# Patient Record
Sex: MALE | Race: WHITE | NOT HISPANIC OR LATINO | Employment: FULL TIME | URBAN - METROPOLITAN AREA
[De-identification: names, ages, dates, MRNs, and addresses within clinical notes are randomized per-mention and may not be internally consistent; named-entity substitution may affect disease eponyms.]

---

## 2017-01-12 ENCOUNTER — OFFICE VISIT (OUTPATIENT)
Dept: FAMILY MEDICINE | Facility: CLINIC | Age: 57
End: 2017-01-12
Payer: COMMERCIAL

## 2017-01-12 VITALS
SYSTOLIC BLOOD PRESSURE: 120 MMHG | DIASTOLIC BLOOD PRESSURE: 73 MMHG | OXYGEN SATURATION: 99 % | TEMPERATURE: 97.5 F | BODY MASS INDEX: 24.84 KG/M2 | WEIGHT: 196 LBS | HEART RATE: 78 BPM

## 2017-01-12 DIAGNOSIS — M51.379 DEGENERATIVE DISC DISEASE AT L5-S1 LEVEL: ICD-10-CM

## 2017-01-12 DIAGNOSIS — G89.29 CHRONIC MIDLINE LOW BACK PAIN WITH RIGHT-SIDED SCIATICA: Primary | ICD-10-CM

## 2017-01-12 DIAGNOSIS — M54.41 CHRONIC MIDLINE LOW BACK PAIN WITH RIGHT-SIDED SCIATICA: Primary | ICD-10-CM

## 2017-01-12 PROCEDURE — 99213 OFFICE O/P EST LOW 20 MIN: CPT | Performed by: FAMILY MEDICINE

## 2017-01-12 NOTE — MR AVS SNAPSHOT
After Visit Summary   1/12/2017    Dawson Garcia    MRN: [id number]           Patient Information     Date Of Birth          1960        Visit Information        Provider Department      1/12/2017 3:20 PM Ruthy Caal MD [facility]        Today's Diagnoses     Chronic midline low back pain with right-sided sciatica    -  1     Degenerative disc disease at L5-S1 level           Care Instructions    Low Back Pain--Exercises  What exercise can I do to reduce low back pain?  Pelvic tilt        Lie on your back with your knees bent. In this relaxed position, the small of your back will not be touching the floor. Tighten your abdominal muscles so that the small of your back presses flat against the floor. Hold for five seconds then relax. Repeat three times and gradually build to 10 repetitions.  Knees-to-chest        Lie on your back with both legs straight. Bring one knee up to your chest, pressing the small of your back into the floor (pelvic tilt). Hold for five seconds and repeat five times. Repeat exercise on other leg.  Back stretch  Lie on your stomach. Use your arms to push your upper body off the floor. Hold for five seconds. Let your back relax and sag. Repeat 10 times.      Exercises To Strengthen Your Lower Back  Strong lower-back and abdominal muscles work together to support your spine. The exercises below will help strengthen the muscles of the lower back. It is important that you begin exercising slowly and increase levels gradually.  Always begin any exercise program with stretching. If you feel pain while doing any of these exercises, stop and talk to your doctor about a more specific exercise program that better suits your condition.   Low back stretch  The point of stretching is to make you more flexible and increase your range of motion. Stretch only as much as you are able. Stretch slowly. Do not push your stretch to the limit. If at any point you feel  pain while stretching, this is your (temporary) limit.    Lie on your back with your knees bent and both feet on the ground.    Slowly raise your left knee to your chest as you flatten your lower back against the floor. Hold for 5 seconds.    Relax and repeat the exercise with your right knee.    Do 10 of these exercises for each leg.    Repeat hugging both knees to your chest at the same time.  Building lower back strength  Start your exercise routine with 10 to 30 minutes a day, 1 to 3 times a day.  Initial exercises  Lying on your back:  1. Ankle pumps: Move your foot up and down, towards your head, and then away. Repeat 10 times with each foot.  2. Heel slides: Slowly bend your knee, drawing the heel of your foot towards you. Then slide your heel/foot from you, straightening your knee. Do not lift your foot off the floor (this is not a leg lift).  3. Abdominal contraction: Bend your knees and put your hands on your stomach. Tighten your stomach muscles. Hold for 5 seconds, then relax. Repeat 10 times.  4. Straight leg raise: Bend one leg at the knee and keep the other leg straight. Tighten your stomach muscles. Slowly lift your straight leg 6 to 12 inches off the floor and hold for up to 5 seconds. Repeat 10 times on each side.  Standin. Wall squats: Stand with your back against the wall. Move your feet about 12 inches away from the wall. Tighten your stomach muscles, and slowly bend your knees until they are at about a 45 degree angle. Do not go down too far. Hold about 5 seconds. Then slowly return to your starting position. Repeat 10 times.  2. Heel raises: Stand facing the wall. Slowly raise the heels of your feet up and down, while keeping your toes on the floor. If you have trouble balancing, you can touch the wall with your hands. Repeat 10 times.  More advanced exercises  When you feel comfortable enough, try these exercises.  1. Kneeling lumbar extension: Begin on your hands and knees. At the same  time, raise and straighten your right arm and left leg until they are parallel to the ground. Hold for 2 seconds and come back slowly to a starting position. Repeat with left arm and right leg, alternating 10 times.  2. Prone lumbar extension: Lie face down, arms extended overhead, palms on the floor. At the same time, raise your right arm and left leg as high as comfortably possible. Hold for 10 seconds and slowly return to start. Repeat with left arm and right leg, alternating 10 times. Gradually build up to 20 times. (Advanced: Repeat this exercise raising both arms and both legs a few inches off the floor at the same time. Hold for 5 seconds and release.)  3. Pelvic tilt: Lie on the floor on your back with your knees bent at 90 degrees. Your feet should be flat on the floor. Inhale, exhale, then slowly contract your abdominal muscles bringing your navel toward your spine. Let your pelvis rock back until your lower back is flat on the floor. Hold for 10 seconds while breathing smoothly.  4. Abdominal crunch: Perform a pelvic tilt (above) flattening your lower back against the floor. Holding the tension in your abdominal muscles, take another breath and raise your shoulder blades off the ground (this is not a full sit-up). Keep your head in line with your body (don t bend your neck forward). Hold for 2 seconds, then slowly lower.    2127-2957 [employer]. [address] [place] [address]. All rights reserved. This information is not intended as a substitute for professional medical care. Always follow your healthcare professional's instructions.              Follow-ups after your visit        Additional Services     [facility] PT, HAND, AND CHIROPRACTIC REFERRAL       **This order will print in the [facility] Scheduling Office**    Physical Therapy, Hand Therapy and Chiropractic Care are available through:    *[facility]  *[facility]  *[facility]  "Care    Call one number to schedule at any of the above locations: [phone].    Your provider has referred you to: physical therapy     Indication/Reason for Referral: low back spine  Onset of Illness: years, acute injuiy  Therapy Orders: Evaluate and Treat  Special Programs: None  Special Request: None    Tabitha Lu      Additional Comments for the Therapist or Chiropractor:     Please be aware that coverage of these services is subject to the terms and limitations of your health insurance plan.  Call member services at your health plan with any benefit or coverage questions.      Please bring the following to your appointment:    *Your personal calendar for scheduling future appointments  *Comfortable clothing                  Who to contact     If you have questions or need follow up information about today's clinic visit or your schedule please contact [facility] directly at [phone].  Normal or non-critical lab and imaging results will be communicated to you by [employer] Scorecard[employer], letter or phone within 4 business days after the clinic has received the results. If you do not hear from us within 7 days, please contact the clinic through [employer]card[employer] or phone. If you have a critical or abnormal lab result, we will notify you by phone as soon as possible.  Submit refill requests through modu or call your pharmacy and they will forward the refill request to us. Please allow 3 business days for your refill to be completed.          Additional Information About Your Visit        modu Information     modu lets you send messages to your doctor, view your test results, renew your prescriptions, schedule appointments and more. To sign up, go to [email or web address] . Click on \"Log in\" on the left side of the screen, which will take you to the Welcome page. Then click on \"Sign up Now\" on the right side of the page.     You will be asked to enter the access code listed below, as well as some " personal information. Please follow the directions to create your username and password.     Your access code is: [email or web address]  Expires: 2017  3:46 PM     Your access code will  in 90 days. If you need help or a new code, please call your [place] clinic or [phone].        Care EveryWhere ID     This is your Care EveryWhere ID. This could be used by other organizations to access your [place] medical records  [id number]        Your Vitals Were     Pulse Temperature Pulse Oximetry             78 97.5  F (36.4  C) (Tympanic) 99%          Blood Pressure from Last 3 Encounters:   17 120/73   16 112/74   13 128/82    Weight from Last 3 Encounters:   17 196 lb (88.905 kg)   16 190 lb (86.183 kg)   13 192 lb (87.091 kg)              We Performed the Following     [facility], AND CH[facility]RACTIC REFERRAL        Primary Care Provider    None Specified       No primary provider on file.        Thank you!     Thank you for choosing [facility]  for your care. Our goal is always to provide you with excellent care. Hearing back from our patients is one way we can continue to improve our services. Please take a few minutes to complete the written survey that you may receive in the mail after your visit with us. Thank you!             Your Updated Medication List - Protect others around you: Learn how to safely use, store and throw away your medicines at www.disposemymeds.org.          This list is accurate as of: 17  3:46 PM.  Always use your most recent med list.                   Brand Name Dispense Instructions for use    ADVIL 200 MG tablet   Generic drug:  ibuprofen     120    TAKE 1 TO 2 TABLETS EVERY 4 TO 6 HOURS AS NEEDED WITH FOOD

## 2017-01-12 NOTE — PROGRESS NOTES
SUBJECTIVE:                                                    Dawson Garcia is a 56 year old male who presents to clinic today for the following health issues:      Musculoskeletal problem/pain; recurrent low back pain      Duration: 1.5 weeks    Description  Location: Middle lower back    Intensity:  moderate    Accompanying signs and symptoms: none    History  Previous similar problem: no   Previous evaluation:  none    Precipitating or alleviating factors:  Trauma or overuse: YES- Working out at the gym; did a rowing machine for 15 minutes, he didn't feel like he overdid it, felt good while exercising, but the next morning back was really tight  Aggravating factors include: he's had similar episodes after going on bike rides in the summer, would have a lot of soreness and spasm the next morning    Therapies tried and outcome: NSAID - ibuprofen       Problem list and histories reviewed & adjusted, as indicated.  Additional history: as documented    Patient Active Problem List   Diagnosis     CARDIOVASCULAR SCREENING; LDL GOAL LESS THAN 160     Chronic midline low back pain with right-sided sciatica     Degenerative disc disease at L5-S1 level     Past Surgical History   Procedure Laterality Date     C appendectomy       Cl aff surgical pathology       Removal of sperm duct(s)  1994      tooth extraction w/forcep         Social History   Substance Use Topics     Smoking status: Never Smoker      Smokeless tobacco: Never Used     Alcohol Use: Yes      Comment: sometimes     Family History   Problem Relation Age of Onset     C.A.D. Paternal Grandfather      HEART DISEASE Paternal Grandfather      MI      DIABETES Father      diet controlled     HEART DISEASE Father      Hypertension No family hx of      CEREBROVASCULAR DISEASE No family hx of      Breast Cancer No family hx of      Cancer - colorectal No family hx of      Prostate Cancer No family hx of      Melanoma No family hx of      Skin Cancer No family  hx of          BP Readings from Last 3 Encounters:   01/12/17 120/73   05/31/16 112/74   06/07/13 128/82    Wt Readings from Last 3 Encounters:   01/12/17 196 lb (88.905 kg)   05/31/16 190 lb (86.183 kg)   06/07/13 192 lb (87.091 kg)                    ROS:  Constitutional, HEENT, cardiovascular, pulmonary, gi and gu systems are negative, except as otherwise noted.    OBJECTIVE:                                                    /73 mmHg  Pulse 78  Temp(Src) 97.5  F (36.4  C) (Tympanic)  Wt 196 lb (88.905 kg)  SpO2 99%  Body mass index is 24.84 kg/(m^2).  /73 mmHg  Pulse 78  Temp(Src) 97.5  F (36.4  C) (Tympanic)  Wt 196 lb (88.905 kg)  SpO2 99%   Patient appears to be in no distress, normal gait noted. Lumbosacral spine area reveals mild tenderness just to right of midline of approximately L5 without muscle spasm or mass.  Normal LS ROM noted. Straight leg raise is positive at 90 degrees on right. DTR's, motor strength and sensation normal, including heel and toe gait.  Peripheral pulses are palpable.    Lumbar spine X-Ray last done 2008, normal at that time.    ASSESSMENT:   ASSESSMENT / PLAN:  (M54.41,  G89.29) Chronic midline low back pain with right-sided sciatica  (primary encounter suspect due to  (M51.36) Degenerative disc disease at L5-S1 level  Comment: start with physical therapy, use standing desk/yoga ball at desk to improve core strength, take ibuprofen after workout  Plan: [facility], AND CH[facility]CTIC REFERRAL            For acute pain, rest, intermittent application of heat (do not sleep on heating pad), analgesics and muscle relaxants are recommended. Discussed longer term treatment plan of prn NSAID's and discussed a home back care exercise program with flexion exercise routine. Proper lifting with avoidance of heavy lifting discussed.   Referred to Physical Therapy  Will obtain XRay studies if not improving and refer to Ortho.   Call or return to clinic prn if these symptoms  worsen or fail to improve as anticipated.

## 2017-01-12 NOTE — NURSING NOTE
"Chief Complaint   Patient presents with     Musculoskeletal Problem       Initial /73 mmHg  Pulse 78  Temp(Src) 97.5  F (36.4  C) (Tympanic)  Wt 196 lb (88.905 kg)  SpO2 99% Estimated body mass index is 24.84 kg/(m^2) as calculated from the following:    Height as of 5/31/16: 6' 2.5\" (1.892 m).    Weight as of this encounter: 196 lb (88.905 kg).  BP completed using cuff size: regular      Muriel Walsh MA    "

## 2017-01-12 NOTE — PATIENT INSTRUCTIONS
Low Back Pain--Exercises  What exercise can I do to reduce low back pain?  Pelvic tilt        Lie on your back with your knees bent. In this relaxed position, the small of your back will not be touching the floor. Tighten your abdominal muscles so that the small of your back presses flat against the floor. Hold for five seconds then relax. Repeat three times and gradually build to 10 repetitions.  Knees-to-chest        Lie on your back with both legs straight. Bring one knee up to your chest, pressing the small of your back into the floor (pelvic tilt). Hold for five seconds and repeat five times. Repeat exercise on other leg.  Back stretch  Lie on your stomach. Use your arms to push your upper body off the floor. Hold for five seconds. Let your back relax and sag. Repeat 10 times.      Exercises To Strengthen Your Lower Back  Strong lower-back and abdominal muscles work together to support your spine. The exercises below will help strengthen the muscles of the lower back. It is important that you begin exercising slowly and increase levels gradually.  Always begin any exercise program with stretching. If you feel pain while doing any of these exercises, stop and talk to your doctor about a more specific exercise program that better suits your condition.   Low back stretch  The point of stretching is to make you more flexible and increase your range of motion. Stretch only as much as you are able. Stretch slowly. Do not push your stretch to the limit. If at any point you feel pain while stretching, this is your (temporary) limit.    Lie on your back with your knees bent and both feet on the ground.    Slowly raise your left knee to your chest as you flatten your lower back against the floor. Hold for 5 seconds.    Relax and repeat the exercise with your right knee.    Do 10 of these exercises for each leg.    Repeat hugging both knees to your chest at the same time.  Building lower back strength  Start your exercise  routine with 10 to 30 minutes a day, 1 to 3 times a day.  Initial exercises  Lying on your back:  1. Ankle pumps: Move your foot up and down, towards your head, and then away. Repeat 10 times with each foot.  2. Heel slides: Slowly bend your knee, drawing the heel of your foot towards you. Then slide your heel/foot from you, straightening your knee. Do not lift your foot off the floor (this is not a leg lift).  3. Abdominal contraction: Bend your knees and put your hands on your stomach. Tighten your stomach muscles. Hold for 5 seconds, then relax. Repeat 10 times.  4. Straight leg raise: Bend one leg at the knee and keep the other leg straight. Tighten your stomach muscles. Slowly lift your straight leg 6 to 12 inches off the floor and hold for up to 5 seconds. Repeat 10 times on each side.  Standin. Wall squats: Stand with your back against the wall. Move your feet about 12 inches away from the wall. Tighten your stomach muscles, and slowly bend your knees until they are at about a 45 degree angle. Do not go down too far. Hold about 5 seconds. Then slowly return to your starting position. Repeat 10 times.  2. Heel raises: Stand facing the wall. Slowly raise the heels of your feet up and down, while keeping your toes on the floor. If you have trouble balancing, you can touch the wall with your hands. Repeat 10 times.  More advanced exercises  When you feel comfortable enough, try these exercises.  1. Kneeling lumbar extension: Begin on your hands and knees. At the same time, raise and straighten your right arm and left leg until they are parallel to the ground. Hold for 2 seconds and come back slowly to a starting position. Repeat with left arm and right leg, alternating 10 times.  2. Prone lumbar extension: Lie face down, arms extended overhead, palms on the floor. At the same time, raise your right arm and left leg as high as comfortably possible. Hold for 10 seconds and slowly return to start. Repeat with  left arm and right leg, alternating 10 times. Gradually build up to 20 times. (Advanced: Repeat this exercise raising both arms and both legs a few inches off the floor at the same time. Hold for 5 seconds and release.)  3. Pelvic tilt: Lie on the floor on your back with your knees bent at 90 degrees. Your feet should be flat on the floor. Inhale, exhale, then slowly contract your abdominal muscles bringing your navel toward your spine. Let your pelvis rock back until your lower back is flat on the floor. Hold for 10 seconds while breathing smoothly.  4. Abdominal crunch: Perform a pelvic tilt (above) flattening your lower back against the floor. Holding the tension in your abdominal muscles, take another breath and raise your shoulder blades off the ground (this is not a full sit-up). Keep your head in line with your body (don t bend your neck forward). Hold for 2 seconds, then slowly lower.    3689-1979 [employer]. [address] [place] [address], [place] [address]. All rights reserved. This information is not intended as a substitute for professional medical care. Always follow your healthcare professional's instructions.

## 2017-01-20 ENCOUNTER — THERAPY VISIT (OUTPATIENT)
Dept: PHYSICAL THERAPY | Facility: CLINIC | Age: 57
End: 2017-01-20
Payer: COMMERCIAL

## 2017-01-20 DIAGNOSIS — M54.50 LUMBAGO: Primary | ICD-10-CM

## 2017-01-20 PROCEDURE — 97110 THERAPEUTIC EXERCISES: CPT | Mod: GP | Performed by: PHYSICAL THERAPIST

## 2017-01-20 PROCEDURE — 97161 PT EVAL LOW COMPLEX 20 MIN: CPT | Mod: GP | Performed by: PHYSICAL THERAPIST

## 2017-01-20 PROCEDURE — 97112 NEUROMUSCULAR REEDUCATION: CPT | Mod: GP | Performed by: PHYSICAL THERAPIST

## 2017-01-20 NOTE — PROGRESS NOTES
"Subjective:  Physical Therapy Initial Evaluation   1/20/17  Ruthy Caal MD Precautions/Restrictions/MD instructions: E and T, LBP   Therapist Impression:   Pt is a 57 y/o male, with 2 week history of LBP. Pt presents with pain, reduced ROM, weakness, reduced mm activation, and postural deficits consistent with mechanical LBP. These impairments limit their ability to ambulate, sit, lift household items, and perform work duties. Skilled PT services necessary in order to reduce impairments and improve independent function.    Subjective:   Chief Complaint: Pt reports that a couple of weeks ago he was working out and tried the rowing machine for approx 15 min. States that either the evening or day after his back \"locked up.\" Reports past hx of back \"going out.\" Sitting for >60 min causes him to be stiff when he stands up. Does seem to be getting better overall  DOI/onset: 2-3 weeks DOS: n/a  Location: [facility] region Quality: mostly dull/achy  Frequency: intermittent  Radiates: nil  Pain scale: Rest 0-1/10 Activity 6/10   Time of day: gets better as the day goes on Sleeping: if he moves he will have some does    Exacerbated by: sitting for long periods of time Relieved by: while sitting Progression: seem to be getting better  Previous Treatment: ibuprofen Effect of prior treatment: helpful   PMH and/or surgical history: appendix/hernia, past hx of LBP  Imaging: none    Occupation:  Job duties: prolonged sitting, computer work   Current HEP/exercise regimen: enjoys biking/walking; tried to start up this month Patient's goals: get back to normal activities  Medications: pain   General health as reported by patient: fair  Return to MD: PRN   HPI                    Objective:  Lumbar Spine/SIJ Evaluation:    Gait: Reduced hip ext, B.    Posture: mild ant tilt  Relevant Lateral Shift: nil    Lumbar AROM:   Motion ROM Pain   Flexion Distal tibia R sided   Extension Min loss nil   Side Bend L WNL nil   Side " Bend R WNL nil   Side Gliding L WNL nil   Side Gliding R WNL nil     Repeated Motion: improved flex ROM w/ less pain    Lumbar Myotomes   L R   T12-L3 (Hip Flexion) 5/5 5/5   L2-L4 (Knee Extension) 5/5 5/5   L4 (Ankle DF) 5/5 5/5   L5 (Great Toe Ext) 5/5 5/5   S1 (Ankle PF) 5/5 5/5     Lumbar DTR's:    L R   L4 (Quad) 1+ 1+   S1 (Achilles) 1+ 1+     Lumbar Dermatomes: WNL    SI Joint Provocation Tests: neg    Muscle Activation    L R   TrA poor poor   Glute Max fair fair       System    Physical Exam    General     ROS    Assessment/Plan:      Patient is a 56 year old male with lumbar complaints.    Patient has the following significant findings with corresponding treatment plan.                Diagnosis 1:  LBP  Pain -  hot/cold therapy, manual therapy, splint/taping/bracing/orthotics, education, directional preference exercise and home program  Decreased ROM/flexibility - manual therapy and therapeutic exercise  Decreased joint mobility - manual therapy and therapeutic exercise  Decreased strength - therapeutic exercise and therapeutic activities  Decreased proprioception - neuro re-education and therapeutic activities  Impaired gait - gait training  Impaired muscle performance - neuro re-education  Decreased function - therapeutic activities  Impaired posture - neuro re-education    Therapy Evaluation Codes:   1) History comprised of:   Personal factors that impact the plan of care:      None.    Comorbidity factors that impact the plan of care are:      None.     Medications impacting care: Pain.  2) Examination of Body Systems comprised of:   Body structures and functions that impact the plan of care:      Hip and Lumbar spine.   Activity limitations that impact the plan of care are:      Bathing, Bending, Dressing, Reading/Computer work and Working and Sleeping.  3) Clinical presentation characteristics are:   Stable/Uncomplicated.  4) Decision-Making    Low complexity using standardized patient assessment  instrument and/or measureable assessment of functional outcome.  Cumulative Therapy Evaluation is: Low complexity.    Previous and current functional limitations:  (See Goal Flow Sheet for this information)    Short term and Long term goals: (See Goal Flow Sheet for this information)     Communication ability:  Patient appears to be able to clearly communicate and understand verbal and written communication and follow directions correctly.  Treatment Explanation - The following has been discussed with the patient:   RX ordered/plan of care  Anticipated outcomes  Possible risks and side effects  This patient would benefit from PT intervention to resume normal activities.   Rehab potential is excellent.    Frequency:  1 X week, once daily  Duration:  for 3-4 visits  Discharge Plan:  Achieve all LTG.  Independent in home treatment program.  Reach maximal therapeutic benefit.    Please refer to the daily flowsheet for treatment today, total treatment time and time spent performing 1:1 timed codes.

## 2017-04-25 ENCOUNTER — OFFICE VISIT (OUTPATIENT)
Dept: FAMILY MEDICINE | Facility: CLINIC | Age: 57
End: 2017-04-25
Payer: COMMERCIAL

## 2017-04-25 VITALS
BODY MASS INDEX: 24.38 KG/M2 | OXYGEN SATURATION: 97 % | DIASTOLIC BLOOD PRESSURE: 64 MMHG | SYSTOLIC BLOOD PRESSURE: 108 MMHG | WEIGHT: 192.5 LBS | HEART RATE: 73 BPM | TEMPERATURE: 97.4 F

## 2017-04-25 DIAGNOSIS — H10.022 PINK EYE, LEFT: Primary | ICD-10-CM

## 2017-04-25 PROCEDURE — 99213 OFFICE O/P EST LOW 20 MIN: CPT | Performed by: FAMILY MEDICINE

## 2017-04-25 RX ORDER — POLYMYXIN B SULFATE AND TRIMETHOPRIM 1; 10000 MG/ML; [USP'U]/ML
1 SOLUTION OPHTHALMIC EVERY 4 HOURS
Qty: 1 BOTTLE | Refills: 0 | Status: SHIPPED | OUTPATIENT
Start: 2017-04-25 | End: 2017-05-02

## 2017-04-25 ASSESSMENT — ANXIETY QUESTIONNAIRES
7. FEELING AFRAID AS IF SOMETHING AWFUL MIGHT HAPPEN: NOT AT ALL
5. BEING SO RESTLESS THAT IT IS HARD TO SIT STILL: NOT AT ALL
6. BECOMING EASILY ANNOYED OR IRRITABLE: NOT AT ALL
IF YOU CHECKED OFF ANY PROBLEMS ON THIS QUESTIONNAIRE, HOW DIFFICULT HAVE THESE PROBLEMS MADE IT FOR YOU TO DO YOUR WORK, TAKE CARE OF THINGS AT HOME, OR GET ALONG WITH OTHER PEOPLE: NOT DIFFICULT AT ALL
GAD7 TOTAL SCORE: 1
1. FEELING NERVOUS, ANXIOUS, OR ON EDGE: NOT AT ALL
3. WORRYING TOO MUCH ABOUT DIFFERENT THINGS: SEVERAL DAYS
2. NOT BEING ABLE TO STOP OR CONTROL WORRYING: NOT AT ALL

## 2017-04-25 ASSESSMENT — PATIENT HEALTH QUESTIONNAIRE - PHQ9: 5. POOR APPETITE OR OVEREATING: NOT AT ALL

## 2017-04-25 NOTE — MR AVS SNAPSHOT
After Visit Summary   4/25/2017    Dawson Garcia    MRN: [id number]           Patient Information     Date Of Birth          1960        Visit Information        Provider Department      4/25/2017 10:20 AM Wegener, Joel Daniel [facility]win[facility]        Today's Diagnoses     Pink eye, left    -  1      Care Instructions    ASSESSMENT AND PLAN  1. Pink eye, left  Treat with eye drops for seven days.     Avoid spread to other's eyes/nose/mouth.     If developing eye allergy symptoms can use over the counter allergy eye drops.     Follow up as needed.       - trimethoprim-polymyxin b (POLYTRIM) ophthalmic solution; Apply 1 drop to eye every 4 hours for 7 days  Dispense: 1 Bottle; Refill: 0        MYCHART SIGNUP FOR E-VISITS AND EASIER COMMUNICATION:  [email or web address]     Zipnosis:  [employer].  Sign up for e-visits for common illnesses!     RADIOLOGY:   [facility]:  [phone] to schedule any radiology tests at [facility]: [phone]    Mammograms/colonoscopies:  [phone]    CONSUMER PRICE LINE for estimates of test costs:  [phone]             Follow-ups after your visit        Who to contact     If you have questions or need follow up information about today's clinic visit or your schedule please contact [facility] directly at [phone].  Normal or non-critical lab and imaging results will be communicated to you by MyChart, letter or phone within 4 business days after the clinic has received the results. If you do not hear from us within 7 days, please contact the clinic through MyChart or phone. If you have a critical or abnormal lab result, we will notify you by phone as soon as possible.  Submit refill requests through [employer]Â® or call your pharmacy and they will forward the refill request to us. Please allow 3 business days for your refill to be completed.          Additional Information About Your  "Visit        Dragonfly List Information     Dragonfly List lets you send messages to your doctor, view your test results, renew your prescriptions, schedule appointments and more. To sign up, go to [email or web address] . Click on \"Log in\" on the left side of the screen, which will take you to the Welcome page. Then click on \"Sign up Now\" on the right side of the page.     You will be asked to enter the access code listed below, as well as some personal information. Please follow the directions to create your username and password.     Your access code is: [email or web address]  Expires: 2017 10:25 AM     Your access code will  in 90 days. If you need help or a new code, please call your [facility] clinic or [phone].        Care EveryWhere ID     This is your Care EveryWhere ID. This could be used by other organizations to access your [facility] medical records  [id number]         Blood Pressure from Last 3 Encounters:   17 120/73   16 112/74   13 128/82    Weight from Last 3 Encounters:   17 196 lb (88.9 kg)   16 190 lb (86.2 kg)   13 192 lb (87.1 kg)              Today, you had the following     No orders found for display         Today's Medication Changes          These changes are accurate as of: 17 10:25 AM.  If you have any questions, ask your nurse or doctor.               Start taking these medicines.        Dose/Directions    trimethoprim-polymyxin b ophthalmic solution   Commonly known as:  POLYTRIM   Used for:  Pink eye, left        Dose:  1 drop   Apply 1 drop to eye every 4 hours for 7 days   Quantity:  1 Bottle   Refills:  0            Where to get your medicines      These medications were sent to [facility] [place], [place] - [address]  [address], [facility] [address]     Phone:  [phone]     trimethoprim-polymyxin b ophthalmic solution                Primary Care Provider    None Specified       No primary provider on file.      "   Thank you!     Thank you for choosing [facility]  for your care. Our goal is always to provide you with excellent care. Hearing back from our patients is one way we can continue to improve our services. Please take a few minutes to complete the written survey that you may receive in the mail after your visit with us. Thank you!             Your Updated Medication List - Protect others around you: Learn how to safely use, store and throw away your medicines at www.disposemymeds.org.          This list is accurate as of: 4/25/17 10:25 AM.  Always use your most recent med list.                   Brand Name Dispense Instructions for use    ADVIL 200 MG tablet   Generic drug:  ibuprofen     120    TAKE 1 TO 2 TABLETS EVERY 4 TO 6 HOURS AS NEEDED WITH FOOD       trimethoprim-polymyxin b ophthalmic solution    POLYTRIM    1 Bottle    Apply 1 drop to eye every 4 hours for 7 days

## 2017-04-25 NOTE — PATIENT INSTRUCTIONS
ASSESSMENT AND PLAN  1. Pink eye, left  Treat with eye drops for seven days.     Avoid spread to other's eyes/nose/mouth.     If developing eye allergy symptoms can use over the counter allergy eye drops.     Follow up as needed.       - trimethoprim-polymyxin b (POLYTRIM) ophthalmic solution; Apply 1 drop to eye every 4 hours for 7 days  Dispense: 1 Bottle; Refill: 0        MYCHART SIGNUP FOR E-VISITS AND EASIER COMMUNICATION:  [email or web address]     Zipnosis:  [place].  Sign up for e-visits for common illnesses!     RADIOLOGY:   [facility]:  [phone] to schedule any radiology tests at [facility]: [phone]    Mammograms/colonoscopies:  [phone]    CONSUMER PRICE LINE for estimates of test costs:  [phone]

## 2017-04-25 NOTE — NURSING NOTE
"Chief Complaint   Patient presents with     Conjunctivitis       Initial /64 (BP Location: Left arm, Patient Position: Chair, Cuff Size: Adult Regular)  Pulse 73  Temp 97.4  F (36.3  C) (Tympanic)  Wt 192 lb 8 oz (87.3 kg)  SpO2 97%  BMI 24.38 kg/m2 Estimated body mass index is 24.38 kg/(m^2) as calculated from the following:    Height as of 5/31/16: 6' 2.5\" (1.892 m).    Weight as of this encounter: 192 lb 8 oz (87.3 kg).  Medication Reconciliation: complete Tim Clark MA      "

## 2017-04-26 ASSESSMENT — PATIENT HEALTH QUESTIONNAIRE - PHQ9: SUM OF ALL RESPONSES TO PHQ QUESTIONS 1-9: 4

## 2017-04-26 ASSESSMENT — ANXIETY QUESTIONNAIRES: GAD7 TOTAL SCORE: 1

## 2017-04-30 ENCOUNTER — OFFICE VISIT (OUTPATIENT)
Dept: URGENT CARE | Facility: URGENT CARE | Age: 57
End: 2017-04-30
Payer: COMMERCIAL

## 2017-04-30 VITALS
DIASTOLIC BLOOD PRESSURE: 64 MMHG | TEMPERATURE: 98 F | SYSTOLIC BLOOD PRESSURE: 128 MMHG | OXYGEN SATURATION: 99 % | RESPIRATION RATE: 14 BRPM | HEART RATE: 94 BPM | BODY MASS INDEX: 23.87 KG/M2 | WEIGHT: 192 LBS | HEIGHT: 75 IN

## 2017-04-30 DIAGNOSIS — H10.12 ALLERGIC CONJUNCTIVITIS, LEFT: Primary | ICD-10-CM

## 2017-04-30 PROCEDURE — 99213 OFFICE O/P EST LOW 20 MIN: CPT | Performed by: FAMILY MEDICINE

## 2017-04-30 NOTE — PROGRESS NOTES
SUBJECTIVE:   Chief Complaint   Patient presents with     Urgent Care     Eye Problem     left eye has pinkeye symptoms x 1 week, was seen on Tuesday and was given eye drops but getting worse.      Dawson Garcia is a 56 year old male with burning, redness, itching, watering in left eye for 7 days.  He was treated for bacterial conjunctivitis with polytrim and the left eye has become more red, puffy eyelid,  The right eye is normal ( no drops on the right)   Other symptoms:  None,  No systemic allergic symptoms  No significant prior ophthalmological history. No change in visual acuity, no photophobia, no severe eye pain.     Patient does not  use contact lenses.    Past Medical History:   Diagnosis Date     Alopecia areata      Backache, unspecified      Esophageal reflux     EGD 2000's     Hiatal hernia        ALLERGIES:  Polytrim [polymyxin b-trimethoprim]      Current Outpatient Prescriptions on File Prior to Visit:  trimethoprim-polymyxin b (POLYTRIM) ophthalmic solution Apply 1 drop to eye every 4 hours for 7 days   ADVIL 200 MG OR TABS TAKE 1 TO 2 TABLETS EVERY 4 TO 6 HOURS AS NEEDED WITH FOOD     No current facility-administered medications on file prior to visit.     Social History   Substance Use Topics     Smoking status: Never Smoker     Smokeless tobacco: Never Used     Alcohol use Yes      Comment: sometimes       Family History   Problem Relation Age of Onset     C.A.D. Paternal Grandfather      HEART DISEASE Paternal Grandfather      MI      DIABETES Father      diet controlled     HEART DISEASE Father      Hypertension No family hx of      CEREBROVASCULAR DISEASE No family hx of      Breast Cancer No family hx of      Cancer - colorectal No family hx of      Prostate Cancer No family hx of      Melanoma No family hx of      Skin Cancer No family hx of        ROS:  INTEGUMENTARY/SKIN: NEGATIVE for worrisome rashes, moles or lesions  ENT/MOUTH: NEGATIVE for ear, mouth and throat  "problems  RESP:NEGATIVE for significant cough or SOB  GI: NEGATIVE for nausea, abdominal pain, heartburn, or change in bowel habits    OBJECTIVE:   /64  Pulse 94  Temp 98  F (36.7  C) (Oral)  Resp 14  Ht 6' 2.5\" (1.892 m)  Wt 192 lb (87.1 kg)  SpO2 99%  BMI 24.32 kg/m2    Patient appears well, vitals signs are normal. Eyes: left eye with findings of typical allergic conjunctivitis noted; erythema , watering with a little eyelid puffiness. PERRLA, no foreign body noted. No periorbital cellulitis. The corneas are clear .       Nose:  Mild swelling of turbinates bilateral, with clear discharge  Face:  No sinus tenderness  Ears: no erythema, no swelling of the bilateral ear canals.  TM's pearly bilateral  Mouth:  Pharynx, no erythema, no swelling  Neck: no cervical lymphadenopathy        ASSESSMENT:   Allergic conjunctivitis, left     Cool wet washcloth over the eyes can provide some temporary relief  Stop polytrim eye drops,  Likely resolution in 3-4 days     "

## 2017-04-30 NOTE — NURSING NOTE
"Chief Complaint   Patient presents with     Urgent Care     Eye Problem     left eye has pinkeye symptoms x 1 week, was seen on Tuesday and was given eye drops but getting worse.        Initial /64  Pulse 94  Temp 98  F (36.7  C) (Oral)  Resp 14  Ht 6' 2.5\" (1.892 m)  Wt 192 lb (87.1 kg)  SpO2 99%  BMI 24.32 kg/m2 Estimated body mass index is 24.32 kg/(m^2) as calculated from the following:    Height as of this encounter: 6' 2.5\" (1.892 m).    Weight as of this encounter: 192 lb (87.1 kg).  Medication Reconciliation: complete  "

## 2017-04-30 NOTE — PATIENT INSTRUCTIONS
Conjunctivitis, Allergic    Conjunctivitis is an irritation of a thin membrane in the eye. This membrane is called the conjunctiva. It covers the white of the eye and the inside of the eyelid. The condition is often known as  pink eye  or  red eye  because the eye looks pink or red. The eye can also be swollen. A thick fluid may leak from the eyelid. The eye may itch and burn, and feel gritty or scratchy.  Allergic conjunctivitis is caused by an allergen. Allergens are substances that cause the body to react with certain symptoms. Allergens that cause eye irritation include things such as house dust or pollen in the air. This can occur seasonally, most often in the spring.  Home care    Eye drops may be prescribed to reduce itching and redness. Use these as directed. Otherwise, over-the-counter decongestant eye drops may be used.    Apply a cool compress (towel soaked in cool water) to the affected eye 3 to 4 times a day to reduce swelling and itching.    It is common to have mucus drainage during the night, causing the eyelids to become crusted by morning. Use a warm, wet cloth to wipe this away. You may also use saline irrigating solution or artificial tears to rinse away mucus in the eye. Do not patch the eye.    You may use acetaminophen or ibuprofen to control pain, unless another medicine was prescribed. (Note: If you have chronic liver or kidney disease, or if you have ever had a stomach ulcer or gastrointestinal bleeding, talk with your healthcare provider before using these medicines.)    Do not wear contact lenses until your eyes have healed and all symptoms are gone.  Follow-up care  Follow up with your healthcare provider, or as advised.  When to seek medical advice  Call your healthcare provider right away if any of these occur:    Increased eyelid swelling    New or worsening drainage from the eye    Increasing redness around the eye    Facial swelling    6540-4089 [employer] [address]  [place] [address]. All rights reserved. This information is not intended as a substitute for professional medical care. Always follow your healthcare professional's instructions.

## 2017-04-30 NOTE — PROGRESS NOTES
Additional history/life update:    Pink eye, left :two days left eye redness, watering,  No itching.  Very mild blurry vision when it gets very watery.  No foreign body/fume exposure concern.  No blisters.     Medical, social, surgical, and family histories reviewed.      REVIEW OF SYSTEMS FOR GENERAL, RESPIRATORY, CV, GI AND PSYCHIATRIC NEGATIVE EXCEPT AS NOTED IN HPI.         OBJECTIVE:  /64 (BP Location: Left arm, Patient Position: Chair, Cuff Size: Adult Regular)  Pulse 73  Temp 97.4  F (36.3  C) (Tympanic)  Wt 192 lb 8 oz (87.3 kg)  SpO2 97%  BMI 24.38 kg/m2    EXAM:  GENERAL APPEARANCE: healthy, alert and no distress  Left eye conjunctiva slightly injected/watery.  No pain with pupillary dilation of either eye.       ASSESSMENT AND PLAN  Patient Instructions   ASSESSMENT AND PLAN  1. Pink eye, left  Treat with eye drops for seven days.     Avoid spread to other's eyes/nose/mouth.     If developing eye allergy symptoms can use over the counter allergy eye drops.     Follow up as needed.       - trimethoprim-polymyxin b (POLYTRIM) ophthalmic solution; Apply 1 drop to eye every 4 hours for 7 days  Dispense: 1 Bottle; Refill: 0        MYCHART SIGNUP FOR E-VISITS AND EASIER COMMUNICATION:  [email or web address]     Zipnosis:  [email or web address].  Sign up for e-visits for common illnesses!     RADIOLOGY:   [facility]:  [phone] to schedule any radiology tests at [facility]: [phone]    Mammograms/colonoscopies:  [phone]    CONSUMER PRICE LINE for estimates of test costs:  [phone]               Joel Wegener,MD

## 2017-10-27 ENCOUNTER — OFFICE VISIT (OUTPATIENT)
Dept: FAMILY MEDICINE | Facility: CLINIC | Age: 57
End: 2017-10-27
Payer: COMMERCIAL

## 2017-10-27 VITALS
SYSTOLIC BLOOD PRESSURE: 111 MMHG | HEART RATE: 80 BPM | WEIGHT: 185.8 LBS | RESPIRATION RATE: 18 BRPM | BODY MASS INDEX: 23.1 KG/M2 | TEMPERATURE: 98.2 F | DIASTOLIC BLOOD PRESSURE: 67 MMHG | HEIGHT: 75 IN

## 2017-10-27 DIAGNOSIS — Z12.5 SCREENING FOR PROSTATE CANCER: ICD-10-CM

## 2017-10-27 DIAGNOSIS — Z00.00 ROUTINE GENERAL MEDICAL EXAMINATION AT A HEALTH CARE FACILITY: Primary | ICD-10-CM

## 2017-10-27 PROCEDURE — 99396 PREV VISIT EST AGE 40-64: CPT | Performed by: FAMILY MEDICINE

## 2017-10-27 NOTE — PROGRESS NOTES
SUBJECTIVE:   CC: Dawson Garcia is an 57 year old male who presents for preventative health visit.     Physical   Annual:     Getting at least 3 servings of Calcium per day::  Yes    Bi-annual eye exam::  Yes    Dental care twice a year::  Yes    Sleep apnea or symptoms of sleep apnea::  Daytime drowsiness    Diet::  Regular (no restrictions)    Frequency of exercise::  2-3 days/week    Duration of exercise::  Less than 15 minutes    Taking medications regularly::  Not Applicable    Additional concerns today::  YES    Physical Activity: Still enjoys walking and biking, but hasn't biked because of back discomfort. Doesn't have much of a plan for winter activity.  Nutrition: Still has a balanced diet, has tried to cut back on pop at lunch time. Mostly eats at home.       Today's PHQ-2 Score:   PHQ-2 ( 1999 Pfizer) 10/27/2017   Q1: Little interest or pleasure in doing things 0   Q2: Feeling down, depressed or hopeless 1   PHQ-2 Score 1   Q1: Little interest or pleasure in doing things Not at all   Q2: Feeling down, depressed or hopeless Several days   PHQ-2 Score 1       Abuse: Current or Past(Physical, Sexual or Emotional)- No  Do you feel safe in your environment - Yes    Social History   Substance Use Topics     Smoking status: Never Smoker     Smokeless tobacco: Never Used     Alcohol use Yes      Comment: sometimes     The patient does not drink >3 drinks per day nor >7 drinks per week.    Last PSA:   PSA   Date Value Ref Range Status   05/31/2016 5.81 (H) 0 - 4 ug/L Final       Reviewed orders with patient. Reviewed health maintenance and updated orders accordingly - Yes      Reviewed and updated as needed this visit by clinical staff  Tobacco  Allergies  Meds  Problems  Med Hx  Surg Hx  Fam Hx  Soc Hx          Reviewed and updated as needed this visit by Provider  Allergies  Meds  Problems            Review of Systems  C: NEGATIVE for fever, chills, change in weight  I: NEGATIVE for worrisome rashes,  "moles or lesions  E: NEGATIVE for vision changes or irritation  ENT: NEGATIVE for ear, mouth and throat problems - can't tell if he's getting cold.  R: NEGATIVE for significant cough or SOB  CV: NEGATIVE for chest pain, palpitations or peripheral edema  GI: NEGATIVE for nausea, abdominal pain, heartburn, or change in bowel habits   male: negative for dysuria, hematuria, decreased urinary stream, has noted decreased strength of erection, urethral discharge   M: NEGATIVE for significant arthralgias or myalgia  N: NEGATIVE for weakness, dizziness or paresthesias   P: NEGATIVE for changes in mood or affect - varies with how busy he is. He finds that looking for work can be emotionally difficult. Has done therapy on and off. Feels his baseline normal is a bit of melancholy.    OBJECTIVE:   /67  Pulse 80  Temp 98.2  F (36.8  C) (Oral)  Resp 18  Ht 6' 2.5\" (1.892 m)  Wt 185 lb 12.8 oz (84.3 kg)  BMI 23.54 kg/m2    Physical Exam  GENERAL: healthy, alert and no distress  EYES: Eyes grossly normal to inspection, PERRL and conjunctivae and sclerae normal  HENT: ear canals and TM's normal, nose and mouth without ulcers or lesions  NECK: no adenopathy, no asymmetry, masses, or scars and thyroid normal to palpation  RESP: lungs clear to auscultation - no rales, rhonchi or wheezes  CV: regular rate and rhythm, normal S1 S2, no S3 or S4, no murmur, click or rub, no peripheral edema and peripheral pulses strong  ABDOMEN: soft, nontender, no hepatosplenomegaly, no masses and bowel sounds normal  MS: no gross musculoskeletal defects noted, no edema  SKIN: no suspicious lesions or rashes  NEURO: Normal strength and tone, mentation intact and speech normal  PSYCH: mentation appears normal, affect normal/bright    ASSESSMENT/PLAN:   Dawson was seen today for physical.    Diagnoses and all orders for this visit:    Routine general medical examination at a health care facility  -     Lipid panel reflex to direct LDL Fasting; " "Future    Screening for prostate cancer  -     PSA, screen; Future        COUNSELING:   Reviewed preventive health counseling, as reflected in patient instructions       Consider AAA screening for ages 65-75 and smoking history       Regular exercise         reports that he has never smoked. He has never used smokeless tobacco.    Estimated body mass index is 23.54 kg/(m^2) as calculated from the following:    Height as of this encounter: 6' 2.5\" (1.892 m).    Weight as of this encounter: 185 lb 12.8 oz (84.3 kg).       Humidifier in the bedroom 30-40% humidity, change filter monthly.  Cold air humidifier not vaporizer.     Consider metatarsal lift inserts for numbness of foot. If that is not sufficient, consider consult with podiatry.    Could consider a medicine like Viagra in the future.    Counseling Resources:  ATP IV Guidelines  Pooled Cohorts Equation Calculator  FRAX Risk Assessment  ICSI Preventive Guidelines  Dietary Guidelines for Americans, 2010  USDA's MyPlate  ASA Prophylaxis  Lung CA Screening    Shlomo Solis MD  [facility]  Answers for HPI/ROS submitted by the patient on 10/27/2017   PHQ-2 Score: 1    "

## 2017-10-27 NOTE — MR AVS SNAPSHOT
After Visit Summary   10/27/2017    Dawson Garcia    MRN: [id number]           Patient Information     Date Of Birth          1960        Visit Information        Provider Department      10/27/2017 2:30 PM Shlomo Anderson MD [facility]        Today's Diagnoses     Screening for prostate cancer    -  1    Routine general medical examination at a health care facility          Care Instructions      Preventive Health Recommendations  Male Ages 50 - 64    Yearly exam:             See your health care provider every year in order to  o   Review health changes.   o   Discuss preventive care.    o   Review your medicines if your doctor has prescribed any.     Have a cholesterol test every 5 years, or more frequently if you are at risk for high cholesterol/heart disease.     Have a diabetes test (fasting glucose) every three years. If you are at risk for diabetes, you should have this test more often.     Have a colonoscopy at age 50, or have a yearly FIT test (stool test). These exams will check for colon cancer.      Talk with your health care provider about whether or not a prostate cancer screening test (PSA) is right for you.    You should be tested each year for STDs (sexually transmitted diseases), if you re at risk.     Shots: Get a flu shot each year. Get a tetanus shot every 10 years.     Nutrition:    Eat at least 5 servings of fruits and vegetables daily.     Eat whole-grain bread, whole-wheat pasta and brown rice instead of white grains and rice.     Talk to your provider about Calcium and Vitamin D.     Lifestyle    Exercise for at least 150 minutes a week (30 minutes a day, 5 days a week). This will help you control your weight and prevent disease.     Limit alcohol to one drink per day.     No smoking.     Wear sunscreen to prevent skin cancer.     See your dentist every six months for an exam and cleaning.     See your eye doctor every 1 to 2  years.    Preventive Health Recommendations  Male Ages 50 - 64    Yearly exam:             See your health care provider every year in order to  o   Review health changes.   o   Discuss preventive care.    o   Review your medicines if your doctor has prescribed any.     Have a cholesterol test every 5 years, or more frequently if you are at risk for high cholesterol/heart disease.     Have a diabetes test (fasting glucose) every three years. If you are at risk for diabetes, you should have this test more often.     Have a colonoscopy at age 50, or have a yearly FIT test (stool test). These exams will check for colon cancer.      Talk with your health care provider about whether or not a prostate cancer screening test (PSA) is right for you.    You should be tested each year for STDs (sexually transmitted diseases), if you re at risk.     Shots: Get a flu shot each year. Get a tetanus shot every 10 years.     Nutrition:    Eat at least 5 servings of fruits and vegetables daily.     Eat whole-grain bread, whole-wheat pasta and brown rice instead of white grains and rice.     Talk to your provider about Calcium and Vitamin D.     Lifestyle    Exercise for at least 150 minutes a week (30 minutes a day, 5 days a week). This will help you control your weight and prevent disease.     Limit alcohol to one drink per day.     No smoking.     Wear sunscreen to prevent skin cancer.     See your dentist every six months for an exam and cleaning.     See your eye doctor every 1 to 2 years.    Humidifier in the bedroom 30-40% humidity, change filter monthly.  Cold air humidifier not vaporizer.     Consider metatarsal lift inserts for numbness of foot. If that is not sufficient, consider consult with podiatry.    Could consider a medicine like Viagra in the future.          Follow-ups after your visit        Future tests that were ordered for you today     Open Future Orders        Priority Expected Expires Ordered    PSA, screen  "Routine  2018 10/27/2017    Lipid panel reflex to direct LDL Fasting Routine  2018 10/27/2017            Who to contact     If you have questions or need follow up information about today's clinic visit or your schedule please contact [facility] directly at [phone].  Normal or non-critical lab and imaging results will be communicated to you by MyChart, letter or phone within 4 business days after the clinic has received the results. If you do not hear from us within 7 days, please contact the clinic through [employer] or phone. If you have a critical or abnormal lab result, we will notify you by phone as soon as possible.  Submit refill requests through [employer] or call your pharmacy and they will forward the refill request to us. Please allow 3 business days for your refill to be completed.          Additional Information About Your Visit        MyChart Information     [employer] lets you send messages to your doctor, view your test results, renew your prescriptions, schedule appointments and more. To sign up, go to [email or web address] . Click on \"Log in\" on the left side of the screen, which will take you to the Welcome page. Then click on \"Sign up Now\" on the right side of the page.     You will be asked to enter the access code listed below, as well as some personal information. Please follow the directions to create your username and password.     Your access code is: [email or web address]  Expires: 2018  3:41 PM     Your access code will  in 90 days. If you need help or a new code, please call your [facility] or [phone].        Care EveryWhere ID     This is your Care EveryWhere ID. This could be used by other organizations to access your [place] medical records  [id number]        Your Vitals Were     Pulse Temperature Respirations Height BMI (Body Mass Index)       80 98.2  F (36.8  C) (Oral) 18 6' 2.5\" (1.892 m) 23.54 kg/m2        Blood Pressure from Last 3 " Encounters:   10/27/17 111/67   04/30/17 128/64   04/25/17 108/64    Weight from Last 3 Encounters:   10/27/17 185 lb 12.8 oz (84.3 kg)   04/30/17 192 lb (87.1 kg)   04/25/17 192 lb 8 oz (87.3 kg)               Primary Care Provider    Physician No Ref-Primary       NO REF-PRIMARY PHYSICIAN        Equal Access to Services     KELLI SEGURA : Hadii aad ku hadasho Soomaali, waaxda luqadaha, qaybta kaalmada adeegyada, waxay idiin hayaan adezahira terrazassh laMarcellaaan ah. So [place] [phone].    ATENCIÓN: Si habla español, tiene a yousif disposición servicios gratuitos de asistencia lingüística. Llame al [phone].    We comply with applicable federal civil rights laws and [place] laws. We do not discriminate on the basis of race, color, national origin, age, disability, sex, sexual orientation, or gender identity.            Thank you!     Thank you for choosing [facility]  for your care. Our goal is always to provide you with excellent care. Hearing back from our patients is one way we can continue to improve our services. Please take a few minutes to complete the written survey that you may receive in the mail after your visit with us. Thank you!             Your Updated Medication List - Protect others around you: Learn how to safely use, store and throw away your medicines at www.disposemymeds.org.          This list is accurate as of: 10/27/17  3:43 PM.  Always use your most recent med list.                   Brand Name Dispense Instructions for use Diagnosis    ADVIL 200 MG tablet   Generic drug:  ibuprofen     120    TAKE 1 TO 2 TABLETS EVERY 4 TO 6 HOURS AS NEEDED WITH FOOD    Lumbago

## 2017-10-27 NOTE — NURSING NOTE
"Chief Complaint   Patient presents with     Physical       Initial /67  Pulse 80  Temp 98.2  F (36.8  C) (Oral)  Resp 18  Ht 6' 2.5\" (1.892 m)  Wt 185 lb 12.8 oz (84.3 kg)  BMI 23.54 kg/m2 Estimated body mass index is 23.54 kg/(m^2) as calculated from the following:    Height as of this encounter: 6' 2.5\" (1.892 m).    Weight as of this encounter: 185 lb 12.8 oz (84.3 kg).  Medication Reconciliation: complete       Marilee Byrne MA    "

## 2017-10-27 NOTE — PROGRESS NOTES
"SUBJECTIVE:   CC: Dawson Garcia is an 57 year old male who presents for preventative health visit.     Physical   Annual:     Getting at least 3 servings of Calcium per day::  Yes    Bi-annual eye exam::  Yes    Dental care twice a year::  Yes    Sleep apnea or symptoms of sleep apnea::  Daytime drowsiness    Diet::  Regular (no restrictions)    Frequency of exercise::  2-3 days/week    Duration of exercise::  Less than 15 minutes    Taking medications regularly::  Not Applicable    Additional concerns today::  YES      {Outside tests to abstract? :036533}    {additional problems to add (Optional):896026}    Today's PHQ-2 Score:   PHQ-2 ( 1999 Pfizer) 10/27/2017   Q1: Little interest or pleasure in doing things 0   Q2: Feeling down, depressed or hopeless 1   PHQ-2 Score 1   Q1: Little interest or pleasure in doing things Not at all   Q2: Feeling down, depressed or hopeless Several days   PHQ-2 Score 1       Abuse: Current or Past(Physical, Sexual or Emotional)- {YES/NO/NA:911203}  Do you feel safe in your environment - {YES/NO/NA:324674}    Social History   Substance Use Topics     Smoking status: Never Smoker     Smokeless tobacco: Never Used     Alcohol use Yes      Comment: sometimes     {ETOH AUDIT:465356}    Last PSA:   PSA   Date Value Ref Range Status   05/31/2016 5.81 (H) 0 - 4 ug/L Final       Reviewed orders with patient. Reviewed health maintenance and updated orders accordingly - {Yes/No:803809::\"Yes\"}  {Chronicprobdata (Optional):514307}    Reviewed and updated as needed this visit by clinical staff         Reviewed and updated as needed this visit by Provider        {HISTORY OPTIONS (Optional):570645}    Review of Systems  {MALE ROS-adult preventive care package:918572::\"C: NEGATIVE for fever, chills, change in weight\",\"I: NEGATIVE for worrisome rashes, moles or lesions\",\"E: NEGATIVE for vision changes or irritation\",\"ENT: NEGATIVE for ear, mouth and throat problems\",\"R: NEGATIVE for significant cough " "or SOB\",\"CV: NEGATIVE for chest pain, palpitations or peripheral edema\",\"GI: NEGATIVE for nausea, abdominal pain, heartburn, or change in bowel habits\",\" male: negative for dysuria, hematuria, decreased urinary stream, erectile dysfunction, urethral discharge\",\"M: NEGATIVE for significant arthralgias or myalgia\",\"N: NEGATIVE for weakness, dizziness or paresthesias\",\"P: NEGATIVE for changes in mood or affect\"}    OBJECTIVE:   There were no vitals taken for this visit.    Physical Exam  {Exam Choices:760650}    ASSESSMENT/PLAN:   {Diag Picklist:054210}    COUNSELING:   {MALE COUNSELING MESSAGES:628668::\"Reviewed preventive health counseling, as reflected in patient instructions\"}    {BP Counseling- Complete if BP >= 120/80  (Optional):701768}     reports that he has never smoked. He has never used smokeless tobacco.  {Tobacco Cessation -- Complete if patient is a smoker (Optional):690325}  Estimated body mass index is 24.32 kg/(m^2) as calculated from the following:    Height as of 4/30/17: 6' 2.5\" (1.892 m).    Weight as of 4/30/17: 192 lb (87.1 kg).   {Weight Management Plan (ACO) Complete if BMI is abnormal-  Ages 18-64  BMI >24.9.  Age 65+ with BMI <23 or >30 (Optional):549041}    Counseling Resources:  ATP IV Guidelines  Pooled Cohorts Equation Calculator  FRAX Risk Assessment  ICSI Preventive Guidelines  Dietary Guidelines for Americans, 2010  USDA's MyPlate  ASA Prophylaxis  Lung CA Screening    Shlomo Solis MD  [facility]  Answers for HPI/ROS submitted by the patient on 10/27/2017   PHQ-2 Score: 1    SUBJECTIVE:   CC: Dawson Garcia is an 57 year old male who presents for preventative health visit.     Healthy Habits:    Do you get at least three servings of calcium containing foods daily (dairy, green leafy vegetables, etc.)? {YES/NO, DAIRY INTAKE:539008::\"yes\"}    Amount of exercise or daily activities, outside of work: {AMOUNT EXERCISE:205830}    Problems taking " "medications regularly {Yes /No default:386253::\"No\"}    Medication side effects: {Yes /No default.:087994::\"No\"}    Have you had an eye exam in the past two years? {YESNOBLANK:351147}    Do you see a dentist twice per year? {YESNOBLANK:982534}    Do you have sleep apnea, excessive snoring or daytime drowsiness?{YESNOBLANK:338044}    {Outside tests to abstract? :783171}    {additional problems to add (Optional):299782}    Today's PHQ-2 Score:   PHQ-2 ( 1999 Pfizer) 10/27/2017 4/25/2017   Q1: Little interest or pleasure in doing things 0 0   Q2: Feeling down, depressed or hopeless 1 1   PHQ-2 Score 1 1   Q1: Little interest or pleasure in doing things Not at all -   Q2: Feeling down, depressed or hopeless Several days -   PHQ-2 Score 1 -     {PHQ-2 LOOK IN ASSESSMENTS :025439}  Abuse: Current or Past(Physical, Sexual or Emotional)- {YES/NO/NA:105145}  Do you feel safe in your environment - {YES/NO/NA:021923}    Social History   Substance Use Topics     Smoking status: Never Smoker     Smokeless tobacco: Never Used     Alcohol use Yes      Comment: sometimes     {ETOH AUDIT:329310}    Last PSA:   PSA   Date Value Ref Range Status   05/31/2016 5.81 (H) 0 - 4 ug/L Final       Reviewed orders with patient. Reviewed health maintenance and updated orders accordingly - {Yes/No:410003::\"Yes\"}  {Chronicprobdata (Optional):777692}    Reviewed and updated as needed this visit by clinical staff         Reviewed and updated as needed this visit by Provider        {HISTORY OPTIONS (Optional):001363}    ROS:  {MALE ROS-adult preventive care package:811188::\"C: NEGATIVE for fever, chills, change in weight\",\"I: NEGATIVE for worrisome rashes, moles or lesions\",\"E: NEGATIVE for vision changes or irritation\",\"ENT: NEGATIVE for ear, mouth and throat problems\",\"R: NEGATIVE for significant cough or SOB\",\"CV: NEGATIVE for chest pain, palpitations or peripheral edema\",\"GI: NEGATIVE for nausea, abdominal pain, heartburn, or change in bowel " "habits\",\" male: negative for dysuria, hematuria, decreased urinary stream, erectile dysfunction, urethral discharge\",\"M: NEGATIVE for significant arthralgias or myalgia\",\"N: NEGATIVE for weakness, dizziness or paresthesias\",\"P: NEGATIVE for changes in mood or affect\"}    OBJECTIVE:   There were no vitals taken for this visit.  EXAM:  {Exam Choices:320620}    ASSESSMENT/PLAN:   {Diag Picklist:679099}    COUNSELING:  {MALE COUNSELING MESSAGES:331965::\"Reviewed preventive health counseling, as reflected in patient instructions\"}    {BP Counseling- Complete if BP >= 120/80  (Optional):970383}     reports that he has never smoked. He has never used smokeless tobacco.  {Tobacco Cessation -- Complete if patient is a smoker (Optional):823682}  Estimated body mass index is 24.32 kg/(m^2) as calculated from the following:    Height as of 4/30/17: 6' 2.5\" (1.892 m).    Weight as of 4/30/17: 192 lb (87.1 kg).   {Weight Management Plan (ACO) Complete if BMI is abnormal-  Ages 18-64  BMI >24.9.  Age 65+ with BMI <23 or >30 (Optional):904563}    Counseling Resources:  ATP IV Guidelines  Pooled Cohorts Equation Calculator  FRAX Risk Assessment  ICSI Preventive Guidelines  Dietary Guidelines for Americans, 2010  USDA's MyPlate  ASA Prophylaxis  Lung CA Screening    Shlomo Solis MD  [facility]  "

## 2017-10-27 NOTE — PATIENT INSTRUCTIONS
Preventive Health Recommendations  Male Ages 50   64    Yearly exam:             See your health care provider every year in order to  o   Review health changes.   o   Discuss preventive care.    o   Review your medicines if your doctor has prescribed any.     Have a cholesterol test every 5 years, or more frequently if you are at risk for high cholesterol/heart disease.     Have a diabetes test (fasting glucose) every three years. If you are at risk for diabetes, you should have this test more often.     Have a colonoscopy at age 50, or have a yearly FIT test (stool test). These exams will check for colon cancer.      Talk with your health care provider about whether or not a prostate cancer screening test (PSA) is right for you.    You should be tested each year for STDs (sexually transmitted diseases), if you re at risk.     Shots: Get a flu shot each year. Get a tetanus shot every 10 years.     Nutrition:    Eat at least 5 servings of fruits and vegetables daily.     Eat whole-grain bread, whole-wheat pasta and brown rice instead of white grains and rice.     Talk to your provider about Calcium and Vitamin D.     Lifestyle    Exercise for at least 150 minutes a week (30 minutes a day, 5 days a week). This will help you control your weight and prevent disease.     Limit alcohol to one drink per day.     No smoking.     Wear sunscreen to prevent skin cancer.     See your dentist every six months for an exam and cleaning.     See your eye doctor every 1 to 2 years.    Preventive Health Recommendations  Male Ages 50   64    Yearly exam:             See your health care provider every year in order to  o   Review health changes.   o   Discuss preventive care.    o   Review your medicines if your doctor has prescribed any.     Have a cholesterol test every 5 years, or more frequently if you are at risk for high cholesterol/heart disease.     Have a diabetes test (fasting glucose) every three years. If you are at  risk for diabetes, you should have this test more often.     Have a colonoscopy at age 50, or have a yearly FIT test (stool test). These exams will check for colon cancer.      Talk with your health care provider about whether or not a prostate cancer screening test (PSA) is right for you.    You should be tested each year for STDs (sexually transmitted diseases), if you re at risk.     Shots: Get a flu shot each year. Get a tetanus shot every 10 years.     Nutrition:    Eat at least 5 servings of fruits and vegetables daily.     Eat whole-grain bread, whole-wheat pasta and brown rice instead of white grains and rice.     Talk to your provider about Calcium and Vitamin D.     Lifestyle    Exercise for at least 150 minutes a week (30 minutes a day, 5 days a week). This will help you control your weight and prevent disease.     Limit alcohol to one drink per day.     No smoking.     Wear sunscreen to prevent skin cancer.     See your dentist every six months for an exam and cleaning.     See your eye doctor every 1 to 2 years.    Humidifier in the bedroom 30-40% humidity, change filter monthly.  Cold air humidifier not vaporizer.     Consider metatarsal lift inserts for numbness of foot. If that is not sufficient, consider consult with podiatry.    Could consider a medicine like Viagra in the future.

## 2017-10-31 DIAGNOSIS — Z12.5 SCREENING FOR PROSTATE CANCER: ICD-10-CM

## 2017-10-31 DIAGNOSIS — Z00.00 ROUTINE GENERAL MEDICAL EXAMINATION AT A HEALTH CARE FACILITY: ICD-10-CM

## 2017-10-31 PROCEDURE — 80061 LIPID PANEL: CPT | Performed by: FAMILY MEDICINE

## 2017-10-31 PROCEDURE — 36415 COLL VENOUS BLD VENIPUNCTURE: CPT | Performed by: FAMILY MEDICINE

## 2017-10-31 PROCEDURE — G0103 PSA SCREENING: HCPCS | Performed by: FAMILY MEDICINE

## 2017-11-01 LAB
CHOLEST SERPL-MCNC: 199 MG/DL
HDLC SERPL-MCNC: 67 MG/DL
LDLC SERPL CALC-MCNC: 115 MG/DL
NONHDLC SERPL-MCNC: 132 MG/DL
PSA SERPL-ACNC: 5.75 UG/L (ref 0–4)
TRIGL SERPL-MCNC: 84 MG/DL

## 2017-11-08 ENCOUNTER — TELEPHONE (OUTPATIENT)
Dept: FAMILY MEDICINE | Facility: CLINIC | Age: 57
End: 2017-11-08

## 2017-11-08 DIAGNOSIS — R97.20 ELEVATED PROSTATE SPECIFIC ANTIGEN (PSA): Primary | ICD-10-CM

## 2017-11-08 NOTE — TELEPHONE ENCOUNTER
Reason for Call: Request for an order or referral:    Order or referral being requested: Urology referral    Date needed: as soon as possible    Has the patient been seen by the PCP for this problem? YES    Additional comments: Patient received lab results and would like a call back to discuss urology referral. Please advise, thank you!    Phone number Patient can be reached at:  Home number on file [phone] (home)    Best Time:  anytime    Can we leave a detailed message on this number?  YES    Call taken on 11/8/2017 at 9:32 AM by Adrienne Esposito

## 2017-11-09 ENCOUNTER — PRE VISIT (OUTPATIENT)
Dept: UROLOGY | Facility: CLINIC | Age: 57
End: 2017-11-09

## 2017-11-10 ENCOUNTER — OFFICE VISIT (OUTPATIENT)
Dept: UROLOGY | Facility: CLINIC | Age: 57
End: 2017-11-10

## 2017-11-10 VITALS
DIASTOLIC BLOOD PRESSURE: 82 MMHG | HEIGHT: 75 IN | BODY MASS INDEX: 23 KG/M2 | SYSTOLIC BLOOD PRESSURE: 119 MMHG | HEART RATE: 62 BPM | WEIGHT: 185 LBS

## 2017-11-10 DIAGNOSIS — R97.20 ELEVATED PROSTATE SPECIFIC ANTIGEN (PSA): Primary | ICD-10-CM

## 2017-11-10 ASSESSMENT — PAIN SCALES - GENERAL: PAINLEVEL: NO PAIN (0)

## 2017-11-10 NOTE — MR AVS SNAPSHOT
After Visit Summary   11/10/2017    Dawson Garcia    MRN: [id number]           Patient Information     Date Of Birth          1960        Visit Information        Provider Department      11/10/2017 4:00 PM Susan Maddox MD [facility]        Today's Diagnoses     Elevated prostate specific antigen (PSA)    -  1      Care Instructions    PSA in about 4-6 weeks.    It was a pleasure meeting with you today.  Thank you for allowing me and my team the privilege of caring for you today.  YOU are the reason we are here, and I truly hope we provided you with the excellent service you deserve.  Please let us know if there is anything else we can do for you so that we can be sure you are leaving completely satisfied with your care experience.      JEANNIE Wills          Follow-ups after your visit        Future tests that were ordered for you today     Open Future Orders        Priority Expected Expires Ordered    PSA tumor marker Routine  11/10/2018 11/10/2017            Who to contact     Please call your clinic at [phone] to:    Ask questions about your health    Make or cancel appointments    Discuss your medicines    Learn about your test results    Speak to your doctor   If you have compliments or concerns about an experience at your clinic, or if you wish to file a complaint, please contact [facility] Patient Relations at [phone] or email us at [email or web address]         Additional Information About Your Visit        MyChart Information     [employer] is an electronic gateway that provides easy, online access to your medical records. With [employer], you can request a clinic appointment, read your test results, renew a prescription or communicate with your care team.     To sign up for [employer] visit the website at [email or web address]   You will be asked to enter the access code  "listed below, as well as some personal information. Please follow the directions to create your username and password.     Your access code is: [email or web address]  Expires: 2018  2:41 PM     Your access code will  in 90 days. If you need help or a new code, please contact your [facility] or call [phone] for assistance.        Care EveryWhere ID     This is your Care EveryWhere ID. This could be used by other organizations to access your [place] medical records  [id number]        Your Vitals Were     Pulse Height BMI (Body Mass Index)             62 1.892 m (6' 2.5\") 23.43 kg/m2          Blood Pressure from Last 3 Encounters:   11/10/17 119/82   10/27/17 111/67   17 128/64    Weight from Last 3 Encounters:   11/10/17 83.9 kg (185 lb)   10/27/17 84.3 kg (185 lb 12.8 oz)   17 87.1 kg (192 lb)               Primary Care Provider Office Phone # Fax #    Susan Maddox [phone]       [address] [address]        Equal Access to Services     KELLI SEGURA : Hadmonica nunezo Sosakina, waaxda luqadaha, qaybta kaalmada adezahirayada, cristina sellers. So [facility] [phone].    ATENCIÓN: Si habla español, tiene a yousif disposición servicios gratuitos de asistencia lingüística. Llame al [phone].    We comply with applicable federal civil rights laws and [place] laws. We do not discriminate on the basis of race, color, national origin, age, disability, sex, sexual orientation, or gender identity.            Thank you!     Thank you for choosing [facility] AND [facility] PR[facility] UROLOGIC CANCERS  for your care. Our goal is always to provide you with excellent care. Hearing back from our patients is one way we can continue to improve our services. Please take a few minutes to complete the written survey that you may receive in the mail after your visit with us. Thank you!           "   Your Updated Medication List - Protect others around you: Learn how to safely use, store and throw away your medicines at www.disposemymeds.org.          This list is accurate as of: 11/10/17  5:01 PM.  Always use your most recent med list.                   Brand Name Dispense Instructions for use Diagnosis    ADVIL 200 MG tablet   Generic drug:  ibuprofen     120    TAKE 1 TO 2 TABLETS EVERY 4 TO 6 HOURS AS NEEDED WITH FOOD    Lumbago

## 2017-11-10 NOTE — NURSING NOTE
"Chief Complaint   Patient presents with     Consult     elevated PSA       Blood pressure 119/82, pulse 62, height 1.892 m (6' 2.5\"), weight 83.9 kg (185 lb). Body mass index is 23.43 kg/(m^2).    Patient Active Problem List   Diagnosis     CARDIOVASCULAR SCREENING; LDL GOAL LESS THAN 160     Chronic midline low back pain with right-sided sciatica     Degenerative disc disease at L5-S1 level     Lumbago       Allergies   Allergen Reactions     Polytrim [Polymyxin B-Trimethoprim] Itching       Current Outpatient Prescriptions   Medication Sig Dispense Refill     ADVIL 200 MG OR TABS TAKE 1 TO 2 TABLETS EVERY 4 TO 6 HOURS AS NEEDED WITH FOOD 120 0       Social History   Substance Use Topics     Smoking status: Never Smoker     Smokeless tobacco: Never Used     Alcohol use Yes      Comment: sometimes       Petra Urias LPN  11/10/2017  4:02 PM    "

## 2017-11-10 NOTE — PROGRESS NOTES
"It was my pleasure to see Dawson Garcia a 57 year old year old male today. Patient was seen in consultation for elevated PSA.    HPI:     Patient presents for evaluation and management of elevated PSA.     PSA trend   2010: 1.46  05/2016: 5.81  10/2017: 5.75      Lower urinary tract symptoms:   Occasional nocturia (rarely 2x per night)   Notes a good stream of urine   Some hesitancy   No straining to void     No history of STIs.   No hematuria     No history of smoking      Past Medical History:   Diagnosis Date     Alopecia areata      Backache, unspecified      Esophageal reflux     EGD 2000's     Hiatal hernia        Past Surgical History:   Procedure Laterality Date     C APPENDECTOMY       CL AFF SURGICAL PATHOLOGY       HC TOOTH EXTRACTION W/FORCEP       REMOVAL OF SPERM DUCT(S)  1994       Family History   Problem Relation Age of Onset     C.A.D. Paternal Grandfather      HEART DISEASE Paternal Grandfather      MI      DIABETES Father      diet controlled     HEART DISEASE Father      Hypertension No family hx of      CEREBROVASCULAR DISEASE No family hx of      Breast Cancer No family hx of      Cancer - colorectal No family hx of      Prostate Cancer No family hx of      Melanoma No family hx of      Skin Cancer No family hx of        Current Outpatient Prescriptions   Medication Sig Dispense Refill     ADVIL 200 MG OR TABS TAKE 1 TO 2 TABLETS EVERY 4 TO 6 HOURS AS NEEDED WITH FOOD 120 0       ALLERGIES: Polytrim [polymyxin b-trimethoprim]      REVIEW OF SYSTEMS:  Skin: negative  Eyes: negative  Ears/Nose/Throat: negative  Respiratory: No shortness of breath, dyspnea on exertion, cough, or hemoptysis  Cardiovascular: negative  Gastrointestinal: negative  Genitourinary: as above  Musculoskeletal: negative  Neurologic: negative  Psychiatric: negative  Hematologic/Lymphatic/Immunologic: negative  Endocrine: negative      GENERAL PHYSICAL EXAM:   Vitals: /82  Pulse 62  Ht 1.892 m (6' 2.5\")  Wt 83.9 kg " (185 lb)  BMI 23.43 kg/m2  Body mass index is 23.43 kg/(m^2).  Constitutional: healthy, alert and no distress  Head: Normocephalic  Neck: Neck supple  Cardiovascular: negative  Respiratory: negative  Gastrointestinal: Abdomen soft, non-tender  Musculoskeletal: extremities normal-  Skin: no suspicious lesions or rashes  Neurologic: Gait normal.   Psychiatric: affect normal/bright and mentation appears normal.       EXAM:   Left Flank: negative  Right Flank: negative  Inguinal Area: normal      RECTAL EXAM:   Size: 2+  Sphincter tone: normal  Tenderness: Absent  Rectal Mass: Absent  Prostate Nodule: Absent         RADIOLOGY: The following tests were reviewed: Need to complete the following Radiology exams prior to the office appointment:  LABS: The last test results for Needs to complete the necessary tests prior to appointment: were reviewed.     ASSESSMENT: 56 y/o M with elevated PSA and very mild lower urinary tract symptoms     PLAN:   PSA in 4-6 weeks   Will consider MRI prostate pending PSA   Follow up after above completed       I spent over 30 minutes with the patient.  Over half this time was spent on counseling for elevated PSA.

## 2017-11-10 NOTE — LETTER
11/10/2017       RE: Dawson Garcia  [address]  [facility] [address]     Dear Colleague,    Thank you for referring your patient, Dawson Garcia, to the [facility] UROLOGY AND INST FOR PROSTATE AND UROLOGIC CANCERS at [facility]. Please see a copy of my visit note below.    It was my pleasure to see Dawson Garcia a 57 year old year old male today. Patient was seen in consultation for elevated PSA.    HPI:     Patient presents for evaluation and management of elevated PSA.     PSA trend   2010: 1.46  05/2016: 5.81  10/2017: 5.75      Lower urinary tract symptoms:   Occasional nocturia (rarely 2x per night)   Notes a good stream of urine   Some hesitancy   No straining to void     No history of STIs.   No hematuria     No history of smoking      Past Medical History:   Diagnosis Date     Alopecia areata      Backache, unspecified      Esophageal reflux     EGD 2000's     Hiatal hernia        Past Surgical History:   Procedure Laterality Date     C APPENDECTOMY       CL AFF SURGICAL PATHOLOGY       HC TOOTH EXTRACTION W/FORCEP       REMOVAL OF SPERM DUCT(S)  1994       Family History   Problem Relation Age of Onset     C.A.D. Paternal Grandfather      HEART DISEASE Paternal Grandfather      MI      DIABETES Father      diet controlled     HEART DISEASE Father      Hypertension No family hx of      CEREBROVASCULAR DISEASE No family hx of      Breast Cancer No family hx of      Cancer - colorectal No family hx of      Prostate Cancer No family hx of      Melanoma No family hx of      Skin Cancer No family hx of        Current Outpatient Prescriptions   Medication Sig Dispense Refill     ADVIL 200 MG OR TABS TAKE 1 TO 2 TABLETS EVERY 4 TO 6 HOURS AS NEEDED WITH FOOD 120 0       ALLERGIES: Polytrim [polymyxin b-trimethoprim]      REVIEW OF SYSTEMS:  Skin: negative  Eyes: negative  Ears/Nose/Throat: negative  Respiratory: No shortness of breath, dyspnea on exertion, cough, or  "hemoptysis  Cardiovascular: negative  Gastrointestinal: negative  Genitourinary: as above  Musculoskeletal: negative  Neurologic: negative  Psychiatric: negative  Hematologic/Lymphatic/Immunologic: negative  Endocrine: negative      GENERAL PHYSICAL EXAM:   Vitals: /82  Pulse 62  Ht 1.892 m (6' 2.5\")  Wt 83.9 kg (185 lb)  BMI 23.43 kg/m2  Body mass index is 23.43 kg/(m^2).  Constitutional: healthy, alert and no distress  Head: Normocephalic  Neck: Neck supple  Cardiovascular: negative  Respiratory: negative  Gastrointestinal: Abdomen soft, non-tender  Musculoskeletal: extremities normal-  Skin: no suspicious lesions or rashes  Neurologic: Gait normal.   Psychiatric: affect normal/bright and mentation appears normal.       EXAM:   Left Flank: negative  Right Flank: negative  Inguinal Area: normal      RECTAL EXAM:   Size: 2+  Sphincter tone: normal  Tenderness: Absent  Rectal Mass: Absent  Prostate Nodule: Absent         RADIOLOGY: The following tests were reviewed: Need to complete the following Radiology exams prior to the office appointment:  LABS: The last test results for Needs to complete the necessary tests prior to appointment: were reviewed.     ASSESSMENT: 58 y/o M with elevated PSA and very mild lower urinary tract symptoms     PLAN:   PSA in 4-6 weeks   Will consider MRI prostate pending PSA   Follow up after above completed       I spent over 30 minutes with the patient.  Over half this time was spent on counseling for elevated PSA.       Again, thank you for allowing me to participate in the care of your patient.      Sincerely,    Susan Maddox MD      "

## 2017-11-10 NOTE — PATIENT INSTRUCTIONS
PSA in about 4-6 weeks.    It was a pleasure meeting with you today.  Thank you for allowing me and my team the privilege of caring for you today.  YOU are the reason we are here, and I truly hope we provided you with the excellent service you deserve.  Please let us know if there is anything else we can do for you so that we can be sure you are leaving completely satisfied with your care experience.      JEANNIE Wills

## 2017-12-05 DIAGNOSIS — R97.20 ELEVATED PROSTATE SPECIFIC ANTIGEN (PSA): ICD-10-CM

## 2017-12-05 LAB — PSA SERPL-MCNC: 6.51 UG/L (ref 0–4)

## 2017-12-05 PROCEDURE — 84153 ASSAY OF PSA TOTAL: CPT | Performed by: FAMILY MEDICINE

## 2017-12-05 PROCEDURE — 36415 COLL VENOUS BLD VENIPUNCTURE: CPT | Performed by: FAMILY MEDICINE

## 2018-01-05 DIAGNOSIS — R97.20 ELEVATED PROSTATE SPECIFIC ANTIGEN (PSA): Primary | ICD-10-CM

## 2018-01-16 ENCOUNTER — PRE VISIT (OUTPATIENT)
Dept: UROLOGY | Facility: CLINIC | Age: 58
End: 2018-01-16

## 2018-01-16 ENCOUNTER — RADIANT APPOINTMENT (OUTPATIENT)
Dept: MRI IMAGING | Facility: CLINIC | Age: 58
End: 2018-01-16
Attending: UROLOGY
Payer: COMMERCIAL

## 2018-01-16 DIAGNOSIS — R97.20 ELEVATED PROSTATE SPECIFIC ANTIGEN (PSA): ICD-10-CM

## 2018-01-16 RX ORDER — GADOBUTROL 604.72 MG/ML
7.5 INJECTION INTRAVENOUS ONCE
Status: COMPLETED | OUTPATIENT
Start: 2018-01-16 | End: 2018-01-16

## 2018-01-16 RX ADMIN — GADOBUTROL 7.5 ML: 604.72 INJECTION INTRAVENOUS at 17:26

## 2018-01-17 NOTE — DISCHARGE INSTRUCTIONS

## 2018-01-26 ENCOUNTER — OFFICE VISIT (OUTPATIENT)
Dept: UROLOGY | Facility: CLINIC | Age: 58
End: 2018-01-26
Payer: COMMERCIAL

## 2018-01-26 VITALS
HEIGHT: 75 IN | HEART RATE: 99 BPM | DIASTOLIC BLOOD PRESSURE: 73 MMHG | BODY MASS INDEX: 23.43 KG/M2 | SYSTOLIC BLOOD PRESSURE: 123 MMHG | WEIGHT: 188.4 LBS

## 2018-01-26 DIAGNOSIS — R97.20 ELEVATED PROSTATE SPECIFIC ANTIGEN (PSA): Primary | ICD-10-CM

## 2018-01-26 RX ORDER — CHOLECALCIFEROL (VITAMIN D3) 25 MCG
CAPSULE ORAL
COMMUNITY
End: 2020-07-17

## 2018-01-26 RX ORDER — IBUPROFEN 200 MG
TABLET ORAL
COMMUNITY
End: 2020-07-17

## 2018-01-26 RX ORDER — METAPROTERENOL SULFATE 10 MG
TABLET ORAL
COMMUNITY
End: 2021-11-17

## 2018-01-26 ASSESSMENT — PAIN SCALES - GENERAL: PAINLEVEL: NO PAIN (0)

## 2018-01-26 NOTE — PATIENT INSTRUCTIONS
Have a PSA drawn in three months.    Call for results, you will or will not have a biopsy based on these results.    It was a pleasure meeting with you today.  Thank you for allowing me and my team the privilege of caring for you today.  YOU are the reason we are here, and I truly hope we provided you with the excellent service you deserve.  Please let us know if there is anything else we can do for you so that we can be sure you are leaving completely satisfied with your care experience.

## 2018-01-26 NOTE — PROGRESS NOTES
UROLOGIC DIAGNOSES:       CURRENT INTERVENTIONS:       HISTORY:        PSA trend   2010: 1.46  05/2016: 5.81  10/2017: 5.75    Repeat PSA in 12/2017 was 6.51  MRI prostate was negative       We reviewed PSA trend as well as MRI findings. Discussed option of prostate biopsy (my recommendation) vs repeat PSA in three months and consider biopsy then. Patient opts for the latter.         Lower urinary tract symptoms:   Occasional nocturia (rarely 2x per night)   Notes a good stream of urine   Some hesitancy   No straining to void      No history of STIs.   No hematuria      No history of smoking        PAST MEDICAL HISTORY:   Past Medical History:   Diagnosis Date     Alopecia areata      Backache, unspecified      Esophageal reflux     EGD 2000's     Hiatal hernia        PAST SURGICAL HISTORY:   Past Surgical History:   Procedure Laterality Date     C APPENDECTOMY       CL AFF SURGICAL PATHOLOGY       HC TOOTH EXTRACTION W/FORCEP       REMOVAL OF SPERM DUCT(S)  1994       FAMILY HISTORY:   Family History   Problem Relation Age of Onset     C.A.D. Paternal Grandfather      HEART DISEASE Paternal Grandfather      MI      DIABETES Father      diet controlled     HEART DISEASE Father      Hypertension No family hx of      CEREBROVASCULAR DISEASE No family hx of      Breast Cancer No family hx of      Cancer - colorectal No family hx of      Prostate Cancer No family hx of      Melanoma No family hx of      Skin Cancer No family hx of        SOCIAL HISTORY:   Social History   Substance Use Topics     Smoking status: Never Smoker     Smokeless tobacco: Never Used     Alcohol use Yes      Comment: sometimes       Current Outpatient Prescriptions   Medication     Omega-3 Fatty Acids (OMEGA-3 FISH OIL) 500 MG CAPS     Cholecalciferol (VITAMIN D-3) 1000 UNITS CAPS     ibuprofen (ADVIL/MOTRIN) 200 MG tablet     No current facility-administered medications for this visit.          PHYSICAL EXAM:    /73  Pulse 99  Ht 1.892  "m (6' 2.5\")  Wt 85.5 kg (188 lb 6.4 oz)  BMI 23.87 kg/m2    HEENT: Normocephalic and atraumatic   Cardiac: Not done  Back/Flank: Not done  CNS/PNS: Not done  Respiratory: Normal non-labored breathing  Abdomen: Soft nontender and nondistended  Peripheral Vascular: Not done  Mental Status: Not done    Penis: Not done  Scrotal Skin: Not done  Testicles: Not done  Epididymis: Not done  Digital Rectal Exam:     Cystoscopy: Not done    Imaging: None    Urinalysis: UA RESULTS:  No results for input(s): COLOR, APPEARANCE, URINEGLC, URINEBILI, URINEKETONE, SG, UBLD, URINEPH, PROTEIN, UROBILINOGEN, NITRITE, LEUKEST, RBCU, WBCU in the last 22904 hours.    PSA: 6.51    Post Void Residual:     Other labs: None today      IMPRESSION:  56 y/o M with elevated PSA negative MRI but continued uptrend in PSA     PLAN:  PSA in three months   Patient will call for results, if continues to be elevated will schedule or biopsy   If lower than present values, will schedule follow up in three months with PSA prior    Total Time: 15 minutes                                      Total in Consultation: greater than 50%     "

## 2018-01-26 NOTE — NURSING NOTE
"Chief Complaint   Patient presents with     RECHECK     Review MRI for an elevated PSA.       Blood pressure 123/73, pulse 99, height 1.892 m (6' 2.5\"), weight 85.5 kg (188 lb 6.4 oz). Body mass index is 23.87 kg/(m^2).    Patient Active Problem List   Diagnosis     CARDIOVASCULAR SCREENING; LDL GOAL LESS THAN 160     Chronic midline low back pain with right-sided sciatica     Degenerative disc disease at L5-S1 level     Lumbago       Allergies   Allergen Reactions     Polytrim [Polymyxin B-Trimethoprim] Itching       Current Outpatient Prescriptions   Medication Sig Dispense Refill     Omega-3 Fatty Acids (OMEGA-3 FISH OIL) 500 MG CAPS        Cholecalciferol (VITAMIN D-3) 1000 UNITS CAPS        ibuprofen (ADVIL/MOTRIN) 200 MG tablet          Social History   Substance Use Topics     Smoking status: Never Smoker     Smokeless tobacco: Never Used     Alcohol use Yes      Comment: sometimes       JEANNIE Galloway  1/26/2018  10:31 AM       "

## 2018-01-26 NOTE — LETTER
1/26/2018       RE: Dawson Garcia  [address]  [facility] [address]     Dear Colleague,    Thank you for referring your patient, Dawson Garcia, to the [facility] UROLOGY AND INST FOR PROSTATE AND UROLOGIC CANCERS at [facility]. Please see a copy of my visit note below.    UROLOGIC DIAGNOSES:       CURRENT INTERVENTIONS:       HISTORY:        PSA trend   2010: 1.46  05/2016: 5.81  10/2017: 5.75    Repeat PSA in 12/2017 was 6.51  MRI prostate was negative       We reviewed PSA trend as well as MRI findings. Discussed option of prostate biopsy (my recommendation) vs repeat PSA in three months and consider biopsy then. Patient opts for the latter.         Lower urinary tract symptoms:   Occasional nocturia (rarely 2x per night)   Notes a good stream of urine   Some hesitancy   No straining to void      No history of STIs.   No hematuria      No history of smoking        PAST MEDICAL HISTORY:   Past Medical History:   Diagnosis Date     Alopecia areata      Backache, unspecified      Esophageal reflux     EGD 2000's     Hiatal hernia        PAST SURGICAL HISTORY:   Past Surgical History:   Procedure Laterality Date     C APPENDECTOMY       CL AFF SURGICAL PATHOLOGY       HC TOOTH EXTRACTION W/FORCEP       REMOVAL OF SPERM DUCT(S)  1994       FAMILY HISTORY:   Family History   Problem Relation Age of Onset     C.A.D. Paternal Grandfather      HEART DISEASE Paternal Grandfather      MI      DIABETES Father      diet controlled     HEART DISEASE Father      Hypertension No family hx of      CEREBROVASCULAR DISEASE No family hx of      Breast Cancer No family hx of      Cancer - colorectal No family hx of      Prostate Cancer No family hx of      Melanoma No family hx of      Skin Cancer No family hx of        SOCIAL HISTORY:   Social History   Substance Use Topics     Smoking status: Never Smoker     Smokeless tobacco: Never Used     Alcohol use Yes      Comment: sometimes  "      Current Outpatient Prescriptions   Medication     Omega-3 Fatty Acids (OMEGA-3 FISH OIL) 500 MG CAPS     Cholecalciferol (VITAMIN D-3) 1000 UNITS CAPS     ibuprofen (ADVIL/MOTRIN) 200 MG tablet     No current facility-administered medications for this visit.          PHYSICAL EXAM:    /73  Pulse 99  Ht 1.892 m (6' 2.5\")  Wt 85.5 kg (188 lb 6.4 oz)  BMI 23.87 kg/m2    HEENT: Normocephalic and atraumatic   Cardiac: Not done  Back/Flank: Not done  CNS/PNS: Not done  Respiratory: Normal non-labored breathing  Abdomen: Soft nontender and nondistended  Peripheral Vascular: Not done  Mental Status: Not done    Penis: Not done  Scrotal Skin: Not done  Testicles: Not done  Epididymis: Not done  Digital Rectal Exam:     Cystoscopy: Not done    Imaging: None    Urinalysis: UA RESULTS:  No results for input(s): COLOR, APPEARANCE, URINEGLC, URINEBILI, URINEKETONE, SG, UBLD, URINEPH, PROTEIN, UROBILINOGEN, NITRITE, LEUKEST, RBCU, WBCU in the last 71303 hours.    PSA: 6.51    Post Void Residual:     Other labs: None today      IMPRESSION:  58 y/o M with elevated PSA negative MRI but continued uptrend in PSA     PLAN:  PSA in three months   Patient will call for results, if continues to be elevated will schedule or biopsy   If lower than present values, will schedule follow up in three months with PSA prior    Total Time: 15 minutes                                      Total in Consultation: greater than 50%       Again, thank you for allowing me to participate in the care of your patient.      Sincerely,    Susan Maddox MD      "

## 2018-01-26 NOTE — MR AVS SNAPSHOT
After Visit Summary   1/26/2018    Dawson Garcia    MRN: [id number]           Patient Information     Date Of Birth          1960        Visit Information        Provider Department      1/26/2018 10:15 AM Susan Maddox MD [facility] Urology and [facility] for Prostate and Urologic Cancers        Today's Diagnoses     Elevated prostate specific antigen (PSA)    -  1      Care Instructions    Have a PSA drawn in three months.    Call for results, you will or will not have a biopsy based on these results.    It was a pleasure meeting with you today.  Thank you for allowing me and my team the privilege of caring for you today.  YOU are the reason we are here, and I truly hope we provided you with the excellent service you deserve.  Please let us know if there is anything else we can do for you so that we can be sure you are leaving completely satisfied with your care experience.                  Follow-ups after your visit        Future tests that were ordered for you today     Open Future Orders        Priority Expected Expires Ordered    PSA tumor marker Routine  1/26/2019 1/26/2018            Who to contact     Please call your clinic at [phone] to:    Ask questions about your health    Make or cancel appointments    Discuss your medicines    Learn about your test results    Speak to your doctor   If you have compliments or concerns about an experience at your clinic, or if you wish to file a complaint, please contact [facility] Patient Relations at [phone] or email us at [email or web address]         Additional Information About Your Visit        MyChart Information     [employer] System[employer]art gives you secure access to your electronic health record. If you see a primary care provider, you can also send messages to your care team and make appointments. If you have questions, please call your primary care clinic.  If you do not have a primary care provider,  "please call [phone] and they will assist you.      Fillm is an electronic gateway that provides easy, online access to your medical records. With Fillm, you can request a clinic appointment, read your test results, renew a prescription or communicate with your care team.     To access your existing account, please contact your [facility] or call [phone] for assistance.        Care EveryWhere ID     This is your Care EveryWhere ID. This could be used by other organizations to access your [facility] medical records  [id number]        Your Vitals Were     Pulse Height BMI (Body Mass Index)             99 1.892 m (6' 2.5\") 23.87 kg/m2          Blood Pressure from Last 3 Encounters:   01/26/18 123/73   11/10/17 119/82   10/27/17 111/67    Weight from Last 3 Encounters:   01/26/18 85.5 kg (188 lb 6.4 oz)   11/10/17 83.9 kg (185 lb)   10/27/17 84.3 kg (185 lb 12.8 oz)                 Today's Medication Changes          These changes are accurate as of 1/26/18 11:33 AM.  If you have any questions, ask your nurse or doctor.               These medicines have changed or have updated prescriptions.        Dose/Directions    ibuprofen 200 MG tablet   Commonly known as:  ADVIL/MOTRIN   This may have changed:  Another medication with the same name was removed. Continue taking this medication, and follow the directions you see here.   Changed by:  Susan Maddox MD        Refills:  0                Primary Care Provider Office Phone # Fax #    Ruthy Sharron Caal [phone] [address] [place] [id number]        Equal Access to Services     ABDOULAYE SEGURA : Hadmonica Mueller, devonte acharya, cristina wild. So [facility] [phone].    ATENCIÓN: Si habla español, tiene a yousif disposición servicios gratuitos de asistencia lingüística. Llame al [phone].    We comply with applicable " federal civil rights laws and [place] laws. We do not discriminate on the basis of race, color, national origin, age, disability, sex, sexual orientation, or gender identity.            Thank you!     Thank you for choosing [facility] UROLOGY AND [facility] FOR PROSTATE AND UROLOGIC CANCERS  for your care. Our goal is always to provide you with excellent care. Hearing back from our patients is one way we can continue to improve our services. Please take a few minutes to complete the written survey that you may receive in the mail after your visit with us. Thank you!             Your Updated Medication List - Protect others around you: Learn how to safely use, store and throw away your medicines at www.disposemymeds.org.          This list is accurate as of 1/26/18 11:33 AM.  Always use your most recent med list.                   Brand Name Dispense Instructions for use Diagnosis    ibuprofen 200 MG tablet    ADVIL/MOTRIN          Omega-3 Fish Oil 500 MG Caps           Vitamin D-3 1000 UNITS Caps

## 2018-04-20 DIAGNOSIS — R97.20 ELEVATED PROSTATE SPECIFIC ANTIGEN (PSA): ICD-10-CM

## 2018-04-20 LAB — PSA SERPL-MCNC: 7.54 UG/L (ref 0–4)

## 2018-04-20 PROCEDURE — 84153 ASSAY OF PSA TOTAL: CPT | Performed by: FAMILY MEDICINE

## 2018-04-20 PROCEDURE — 36415 COLL VENOUS BLD VENIPUNCTURE: CPT | Performed by: FAMILY MEDICINE

## 2018-07-16 ENCOUNTER — OFFICE VISIT (OUTPATIENT)
Dept: URGENT CARE | Facility: URGENT CARE | Age: 58
End: 2018-07-16
Payer: COMMERCIAL

## 2018-07-16 VITALS
RESPIRATION RATE: 16 BRPM | SYSTOLIC BLOOD PRESSURE: 121 MMHG | WEIGHT: 194 LBS | BODY MASS INDEX: 24.57 KG/M2 | HEART RATE: 72 BPM | TEMPERATURE: 98.2 F | DIASTOLIC BLOOD PRESSURE: 80 MMHG | OXYGEN SATURATION: 98 %

## 2018-07-16 DIAGNOSIS — H65.91 MIDDLE EAR EFFUSION, RIGHT: ICD-10-CM

## 2018-07-16 DIAGNOSIS — H61.22 IMPACTED CERUMEN OF LEFT EAR: Primary | ICD-10-CM

## 2018-07-16 PROCEDURE — 69209 REMOVE IMPACTED EAR WAX UNI: CPT | Mod: LT | Performed by: PHYSICIAN ASSISTANT

## 2018-07-16 PROCEDURE — 99213 OFFICE O/P EST LOW 20 MIN: CPT | Mod: 25 | Performed by: PHYSICIAN ASSISTANT

## 2018-07-16 NOTE — PROGRESS NOTES
"SUBJECTIVE:   Dawson Garcia is a 58 year old male presenting with a chief complaint of   Chief Complaint   Patient presents with     Ear Problem     left ear pain for about 3 weeks       Onset of symptoms was 3 weeks(s) ago.  Course of illness is worsening.    Severity moderate  Current and Associated symptoms: over the last 3 days, has been noticing a pain in left ear when he blows his nose. Feels like a pressure. No ear drainage. No fever. No hearing changes. Admits to mild congestion and runny nose. Mild, occasional sore throat- describes \"irritated throat.\"  Treatment measures tried include: none  Predisposing factors include: none    ROS   See HPI    PMH:  Past Medical History:   Diagnosis Date     Alopecia areata      Backache, unspecified      Esophageal reflux     EGD 2000's     Hiatal hernia        Current Medications:  Current Outpatient Prescriptions   Medication Sig Dispense Refill     Cholecalciferol (VITAMIN D-3) 1000 UNITS CAPS        ibuprofen (ADVIL/MOTRIN) 200 MG tablet        Omega-3 Fatty Acids (OMEGA-3 FISH OIL) 500 MG CAPS          Surgical History:  Past Surgical History:   Procedure Laterality Date     C APPENDECTOMY       CL AFF SURGICAL PATHOLOGY       HC TOOTH EXTRACTION W/FORCEP       REMOVAL OF SPERM DUCT(S)  1994       Family History:  Family History   Problem Relation Age of Onset     C.A.D. Paternal Grandfather      HEART DISEASE Paternal Grandfather      MI      Diabetes Father      diet controlled     HEART DISEASE Father      Hypertension No family hx of      Cerebrovascular Disease No family hx of      Breast Cancer No family hx of      Cancer - colorectal No family hx of      Prostate Cancer No family hx of      Melanoma No family hx of      Skin Cancer No family hx of        Social History:  Social History   Substance Use Topics     Smoking status: Never Smoker     Smokeless tobacco: Never Used     Alcohol use Yes      Comment: sometimes            OBJECTIVE  /80 (BP " Location: Left arm, Cuff Size: Adult Regular)  Pulse 72  Temp 98.2  F (36.8  C)  Resp 16  Wt 194 lb (88 kg)  SpO2 98%  BMI 24.57 kg/m2    General: alert, appears well, NAD. Afebrile.  Skin: no suspicious lesions or rashes.  HEENT: Normocephalic.   Eyes: conjunctiva clear.   Ears: Right ear: middle ear serous effusion present. No TM erythema. Left ear: cerumen impaction. No canal erythema or edema.   Nose: no nasal polyps. No edema of nasal mucosa. No rhinorrhea.  Oropharynx: MMM. Mild posterior pharyngeal erythema. No exudate. No tonsillar hypertrophy. Uvula midline.    Neck: supple, no lymphadenopathy.  Respiratory: No distress. Equal inspiration to bilateral bases. No crackles wheeze, rhonchi, rales.   Cardiovascular: RRR. No murmurs, clicks, gallups, or rub.            ASSESSMENT/PLAN:    ICD-10-CM    1. Impacted cerumen of left ear H61.22 HC REMOVAL IMPACTED CERUMEN IRRIGATION/LVG UNILAT   2. Middle ear effusion, right H65.91            Medical Decision Making:    Cerumen impaction of left ear canal noted.  MA performed irrigation.  No signs of otitis media, effusion, or otitis externa after cerumen removal, and patient had resolution of symptoms of left ear.    For right ear effusion, recommend Mucinex. No signs of infection today.      Serious Comorbid Conditions: none      At the end of the encounter, I discussed all available results, as well as the diagnosis and any associated medications. I discussed red flags for immediate return to clinic/ER, as well as indications for follow up. Refer to patient instructions below, which were all addressed with patient. Patient understood and agreed to plan. Patient was appropriate for discharge.      There are no Patient Instructions on file for this visit.            Kaylah Phillips PA-C

## 2018-07-16 NOTE — MR AVS SNAPSHOT
After Visit Summary   7/16/2018    Dawson Garcia    MRN: [id number]           Patient Information     Date Of Birth          1960        Visit Information        Provider Department      7/16/2018 5:45 PM Kaylah Phillips PA-C [facility]        Today's Diagnoses     Impacted cerumen of left ear    -  1    Middle ear effusion, right          Care Instructions    Glad we were able to remove the ear wax today!  Try Mucinex for the effusion of the right ear.  Follow up if no improvement in symptoms.          Follow-ups after your visit        Follow-up notes from your care team     Return if symptoms worsen or fail to improve.      Who to contact     If you have questions or need follow up information about today's clinic visit or your schedule please contact [facility] directly at [phone].  Normal or non-critical lab and imaging results will be communicated to you by Blackberryhart, letter or phone within 4 business days after the clinic has received the results. If you do not hear from us within 7 days, please contact the clinic through Blackberryhart or phone. If you have a critical or abnormal lab result, we will notify you by phone as soon as possible.  Submit refill requests through ONStor or call your pharmacy and they will forward the refill request to us. Please allow 3 business days for your refill to be completed.          Additional Information About Your Visit        MyChart Information     ONStor gives you secure access to your electronic health record. If you see a primary care provider, you can also send messages to your care team and make appointments. If you have questions, please call your primary care clinic.  If you do not have a primary care provider, please call [phone] and they will assist you.        Care EveryWhere ID     This is your Care EveryWhere ID. This could be used by other organizations to access your [facility]  medical records  [id number]        Your Vitals Were     Pulse Temperature Respirations Pulse Oximetry BMI (Body Mass Index)       72 98.2  F (36.8  C) 16 98% 24.57 kg/m2        Blood Pressure from Last 3 Encounters:   07/16/18 121/80   01/26/18 123/73   11/10/17 119/82    Weight from Last 3 Encounters:   07/16/18 194 lb (88 kg)   01/26/18 188 lb 6.4 oz (85.5 kg)   11/10/17 185 lb (83.9 kg)              We Performed the Following     HC REMOVAL IMPACTED CERUMEN IRRIGATION/LVG UNILAT        Primary Care Provider Office Phone # Fax #    Ruthy Caal [phone] [address] [facility] [address]        Equal Access to Services     KELLI SEGURA : Hadii lourdes bianchi hadasho Sosakina, waaxda luqadaha, qaybta kaalmada adeegyada, cristina connell . So [facility] [phone].    ATENCIÓN: Si habla español, tiene a yousif disposición servicios gratuitos de asistencia lingüística. Maurisio al [phone].    We comply with applicable federal civil rights laws and [place] laws. We do not discriminate on the basis of race, color, national origin, age, disability, sex, sexual orientation, or gender identity.            Thank you!     Thank you for choosing [facility]  for your care. Our goal is always to provide you with excellent care. Hearing back from our patients is one way we can continue to improve our services. Please take a few minutes to complete the written survey that you may receive in the mail after your visit with us. Thank you!             Your Updated Medication List - Protect others around you: Learn how to safely use, store and throw away your medicines at www.disposemymeds.org.          This list is accurate as of 7/16/18  8:18 PM.  Always use your most recent med list.                   Brand Name Dispense Instructions for use Diagnosis    ibuprofen 200 MG tablet    ADVIL/MOTRIN          Omega-3 Fish Oil 500 MG Caps           Vitamin D-3  1000 units Caps

## 2018-07-17 NOTE — PATIENT INSTRUCTIONS
Glad we were able to remove the ear wax today!  Try Mucinex for the effusion of the right ear.  Follow up if no improvement in symptoms.

## 2018-07-27 ENCOUNTER — DOCUMENTATION ONLY (OUTPATIENT)
Dept: LAB | Facility: CLINIC | Age: 58
End: 2018-07-27

## 2018-07-27 NOTE — PROGRESS NOTES
Please review, diagnose, and sign pending future order for PSA.  Pt coming to [facility] on July 31 for blood draw.  Any other labs needed?  Please close this encounter when you are finished.  Thank you.

## 2018-07-31 DIAGNOSIS — R97.20 ELEVATED PROSTATE SPECIFIC ANTIGEN (PSA): ICD-10-CM

## 2018-07-31 PROCEDURE — 36415 COLL VENOUS BLD VENIPUNCTURE: CPT | Performed by: UROLOGY

## 2018-07-31 PROCEDURE — 84153 ASSAY OF PSA TOTAL: CPT | Performed by: UROLOGY

## 2018-08-01 ENCOUNTER — TELEPHONE (OUTPATIENT)
Dept: UROLOGY | Facility: CLINIC | Age: 58
End: 2018-08-01

## 2018-08-01 DIAGNOSIS — R97.20 ELEVATED PROSTATE SPECIFIC ANTIGEN (PSA): Primary | ICD-10-CM

## 2018-08-01 LAB — PSA SERPL-MCNC: 6.79 UG/L (ref 0–4)

## 2018-08-01 NOTE — TELEPHONE ENCOUNTER
Called lab and told them I put in orders for psa from dr guzman and she will sign order. Rose Marie Gomez, [facility] Staff Nurse

## 2018-08-01 NOTE — TELEPHONE ENCOUNTER
[facility] Call Center    Phone Message    May a detailed message be left on voicemail: no    Reason for Call: Other: Nani from [facility] lab called to request that Dr. Maddox sign the order for the Pt's PSA test that was forwarded from the Pt's PCP Dr. Ruthy Caal. Pt has already completed the lab as of yesterday, 7/31, and Nani said they cannot keep the sample much longer or else the Pt will need to come back and have another blood draw. Please have Dr. Maddox sign the order ASAP so they can see it's completed. If Dr. Maddox can't sign it in a reasonable timeframe, it doesn't matter to Nani who does it.    Action Taken: Message routed to:  [facility]

## 2018-10-03 DIAGNOSIS — N52.9 ED (ERECTILE DYSFUNCTION): Primary | ICD-10-CM

## 2018-10-03 RX ORDER — SILDENAFIL CITRATE 20 MG/1
20 TABLET ORAL 3 TIMES DAILY
Qty: 30 TABLET | Refills: 3 | Status: SHIPPED | OUTPATIENT
Start: 2018-10-03 | End: 2020-07-17

## 2018-12-01 NOTE — MR AVS SNAPSHOT
After Visit Summary   4/30/2017    Dawson Garcia    MRN: [id number]           Patient Information     Date Of Birth          1960        Visit Information        Provider Department      4/30/2017 11:30 AM Kim Shields MD [facility]        Care Instructions      Conjunctivitis, Allergic    Conjunctivitis is an irritation of a thin membrane in the eye. This membrane is called the conjunctiva. It covers the white of the eye and the inside of the eyelid. The condition is often known as  pink eye  or  red eye  because the eye looks pink or red. The eye can also be swollen. A thick fluid may leak from the eyelid. The eye may itch and burn, and feel gritty or scratchy.  Allergic conjunctivitis is caused by an allergen. Allergens are substances that cause the body to react with certain symptoms. Allergens that cause eye irritation include things such as house dust or pollen in the air. This can occur seasonally, most often in the spring.  Home care    Eye drops may be prescribed to reduce itching and redness. Use these as directed. Otherwise, over-the-counter decongestant eye drops may be used.    Apply a cool compress (towel soaked in cool water) to the affected eye 3 to 4 times a day to reduce swelling and itching.    It is common to have mucus drainage during the night, causing the eyelids to become crusted by morning. Use a warm, wet cloth to wipe this away. You may also use saline irrigating solution or artificial tears to rinse away mucus in the eye. Do not patch the eye.    You may use acetaminophen or ibuprofen to control pain, unless another medicine was prescribed. (Note: If you have chronic liver or kidney disease, or if you have ever had a stomach ulcer or gastrointestinal bleeding, talk with your healthcare provider before using these medicines.)    Do not wear contact lenses until your eyes have healed and all symptoms are gone.  Follow-up care  Follow up with  · Echo from November 16 2018 with LVEF 50% (was 45% prior per outpatient Cardiology records) "your healthcare provider, or as advised.  When to seek medical advice  Call your healthcare provider right away if any of these occur:    Increased eyelid swelling    New or worsening drainage from the eye    Increasing redness around the eye    Facial swelling    2476-3184 [employer]. [address] [place] [address], [place] [address]. All rights reserved. This information is not intended as a substitute for professional medical care. Always follow your healthcare professional's instructions.              Follow-ups after your visit        Who to contact     If you have questions or need follow up information about today's clinic visit or your schedule please contact [facility] directly at [phone].  Normal or non-critical lab and imaging results will be communicated to you by MyChart, letter or phone within 4 business days after the clinic has received the results. If you do not hear from us within 7 days, please contact the clinic through [facility] or phone. If you have a critical or abnormal lab result, we will notify you by phone as soon as possible.  Submit refill requests through Activehours or call your pharmacy and they will forward the refill request to us. Please allow 3 business days for your refill to be completed.          Additional Information About Your Visit        [facility] ([employer]) Information     Activehours lets you send messages to your doctor, view your test results, renew your prescriptions, schedule appointments and more. To sign up, go to [email or web address] . Click on \"Log in\" on the left side of the screen, which will take you to the Welcome page. Then click on \"Sign up Now\" on the right side of the page.     You will be asked to enter the access code listed below, as well as some personal information. Please follow the directions to create your username and password.     Your access code is: [email or web address]  Expires: 2017 10:25 AM     Your access code will  in " "90 days. If you need help or a new code, please call your [place] clinic or [phone].        Care EveryWhere ID     This is your Care EveryWhere ID. This could be used by other organizations to access your [facility] medical records  [id number]        Your Vitals Were     Pulse Temperature Respirations Height Pulse Oximetry BMI (Body Mass Index)    94 98  F (36.7  C) (Oral) 14 6' 2.5\" (1.892 m) 99% 24.32 kg/m2       Blood Pressure from Last 3 Encounters:   04/30/17 128/64   04/25/17 108/64   01/12/17 120/73    Weight from Last 3 Encounters:   04/30/17 192 lb (87.1 kg)   04/25/17 192 lb 8 oz (87.3 kg)   01/12/17 196 lb (88.9 kg)              Today, you had the following     No orders found for display       Primary Care Provider    None Specified       No primary provider on file.        Thank you!     Thank you for choosing [facility]  for your care. Our goal is always to provide you with excellent care. Hearing back from our patients is one way we can continue to improve our services. Please take a few minutes to complete the written survey that you may receive in the mail after your visit with us. Thank you!             Your Updated Medication List - Protect others around you: Learn how to safely use, store and throw away your medicines at www.disposemymeds.org.          This list is accurate as of: 4/30/17 11:43 AM.  Always use your most recent med list.                   Brand Name Dispense Instructions for use    ADVIL 200 MG tablet   Generic drug:  ibuprofen     120    TAKE 1 TO 2 TABLETS EVERY 4 TO 6 HOURS AS NEEDED WITH FOOD       trimethoprim-polymyxin b ophthalmic solution    POLYTRIM    1 Bottle    Apply 1 drop to eye every 4 hours for 7 days         "

## 2019-11-05 ENCOUNTER — HEALTH MAINTENANCE LETTER (OUTPATIENT)
Age: 59
End: 2019-11-05

## 2020-07-17 ENCOUNTER — OFFICE VISIT (OUTPATIENT)
Dept: FAMILY MEDICINE | Facility: CLINIC | Age: 60
End: 2020-07-17
Payer: COMMERCIAL

## 2020-07-17 ENCOUNTER — TELEPHONE (OUTPATIENT)
Dept: FAMILY MEDICINE | Facility: CLINIC | Age: 60
End: 2020-07-17

## 2020-07-17 VITALS
TEMPERATURE: 98.4 F | HEIGHT: 74 IN | BODY MASS INDEX: 25.73 KG/M2 | SYSTOLIC BLOOD PRESSURE: 126 MMHG | WEIGHT: 200.5 LBS | DIASTOLIC BLOOD PRESSURE: 81 MMHG | HEART RATE: 83 BPM

## 2020-07-17 DIAGNOSIS — E78.5 HYPERLIPIDEMIA LDL GOAL <100: ICD-10-CM

## 2020-07-17 DIAGNOSIS — Z13.1 SCREENING FOR DIABETES MELLITUS: ICD-10-CM

## 2020-07-17 DIAGNOSIS — K62.89 ANAL IRRITATION: ICD-10-CM

## 2020-07-17 DIAGNOSIS — Z12.5 SCREENING FOR PROSTATE CANCER: ICD-10-CM

## 2020-07-17 DIAGNOSIS — Z00.00 ROUTINE GENERAL MEDICAL EXAMINATION AT A HEALTH CARE FACILITY: Primary | ICD-10-CM

## 2020-07-17 DIAGNOSIS — N52.9 ERECTILE DYSFUNCTION, UNSPECIFIED ERECTILE DYSFUNCTION TYPE: ICD-10-CM

## 2020-07-17 DIAGNOSIS — Z12.11 SCREEN FOR COLON CANCER: ICD-10-CM

## 2020-07-17 DIAGNOSIS — R53.83 FATIGUE, UNSPECIFIED TYPE: ICD-10-CM

## 2020-07-17 PROCEDURE — 99396 PREV VISIT EST AGE 40-64: CPT | Performed by: FAMILY MEDICINE

## 2020-07-17 RX ORDER — OMEPRAZOLE 20 MG/1
20 TABLET, DELAYED RELEASE ORAL DAILY
COMMUNITY

## 2020-07-17 RX ORDER — SILDENAFIL CITRATE 20 MG/1
TABLET ORAL
Qty: 30 TABLET | Refills: 3 | Status: SHIPPED | OUTPATIENT
Start: 2020-07-17 | End: 2021-08-11

## 2020-07-17 RX ORDER — MULTIPLE VITAMINS W/ MINERALS TAB 9MG-400MCG
1 TAB ORAL DAILY
COMMUNITY
End: 2021-11-17

## 2020-07-17 ASSESSMENT — PAIN SCALES - GENERAL: PAINLEVEL: NO PAIN (0)

## 2020-07-17 ASSESSMENT — MIFFLIN-ST. JEOR: SCORE: 1785.7

## 2020-07-17 NOTE — PROGRESS NOTES
3  SUBJECTIVE:   CC: Dawson Garcia is an 60 year old male who presents for preventive health visit.     Healthy Habits:    Do you get at least three servings of calcium containing foods daily (dairy, green leafy vegetables, etc.)? yes    Amount of exercise or daily activities, outside of work: 4 day(s) per week    Problems taking medications regularly No    Medication side effects: No    Have you had an eye exam in the past two years? no    Do you see a dentist twice per year? yes    Do you have sleep apnea, excessive snoring or daytime drowsiness?no      Hemorrhoids acting  Up for a month    Had them a long time ago, resolved    Like over 30 years ago    Just a bit of bleeding        Today's PHQ-2 Score:   PHQ-2 ( 1999 Pfizer) 7/17/2020 10/27/2017   Q1: Little interest or pleasure in doing things 0 0   Q2: Feeling down, depressed or hopeless 0 1   PHQ-2 Score 0 1   Q1: Little interest or pleasure in doing things - Not at all   Q2: Feeling down, depressed or hopeless - Several days   PHQ-2 Score - 1       Abuse: Current or Past(Physical, Sexual or Emotional)- No  Do you feel safe in your environment? Yes    Have you ever done Advance Care Planning? (For example, a Health Directive, POLST, or a discussion with a medical provider or your loved ones about your wishes): Yes, advance care planning is on file.    Social History     Tobacco Use     Smoking status: Never Smoker     Smokeless tobacco: Never Used   Substance Use Topics     Alcohol use: Yes     Comment: sometimes     If you drink alcohol do you typically have >3 drinks per day or >7 drinks per week? Yes - AUDIT SCORE:     No flowsheet data found.                      Last PSA:   PSA   Date Value Ref Range Status   07/31/2018 6.79 (H) 0 - 4 ug/L Final     Comment:     Assay Method:  Chemiluminescence using Siemens Vista analyzer       Reviewed orders with patient. Reviewed health maintenance and updated orders accordingly - Yes        Reviewed and updated as  "needed this visit by clinical staff  Tobacco  Allergies  Meds  Med Hx  Surg Hx  Fam Hx  Soc Hx        Reviewed and updated as needed this visit by Provider            ROS:  CONSTITUTIONAL: NEGATIVE for fever, chills, change in weight  INTEGUMENTARY/SKIN: NEGATIVE for worrisome rashes, moles or lesions  EYES: NEGATIVE for vision changes or irritation  ENT: NEGATIVE for ear, mouth and throat problems  RESP: NEGATIVE for significant cough or SOB  CV: NEGATIVE for chest pain, palpitations or peripheral edema  GI: NEGATIVE for nausea, abdominal pain, heartburn, or change in bowel habits   male: negative for dysuria, hematuria, decreased urinary stream, erectile dysfunction, urethral discharge  MUSCULOSKELETAL: NEGATIVE for significant arthralgias or myalgia  NEURO: NEGATIVE for weakness, dizziness or paresthesias  PSYCHIATRIC: NEGATIVE for changes in mood or affect  Anal itching when quit the preparation H    Mostly itchy and irritated    Kids in good health    One in [employer]    Walks  For exercise        OBJECTIVE:   /81 (BP Location: Left arm, Patient Position: Chair, Cuff Size: Adult Regular)   Pulse 83   Temp 98.4  F (36.9  C) (Oral)   Ht 1.89 m (6' 2.41\")   Wt 90.9 kg (200 lb 8 oz)   BMI 25.46 kg/m    EXAM:  GENERAL: healthy, alert and no distress  EYES: Eyes grossly normal to inspection, PERRL and conjunctivae and sclerae normal  HENT: ear canals and TM's normal, nose and mouth without ulcers or lesions  NECK: no adenopathy, no asymmetry, masses, or scars and thyroid normal to palpation  RESP: lungs clear to auscultation - no rales, rhonchi or wheezes  CV: regular rate and rhythm, normal S1 S2, no S3 or S4, no murmur, click or rub, no peripheral edema and peripheral pulses strong  ABDOMEN: soft, nontender, no hepatosplenomegaly, no masses and bowel sounds normal  RECTAL: anal  Area with no large bumps/ masses; some tags  present  MS: no gross musculoskeletal defects noted, no edema  SKIN: no " "suspicious lesions or rashes  NEURO: Normal strength and tone, mentation intact and speech normal  PSYCH: mentation appears normal, affect normal/bright    Diagnostic Test Results:  Labs reviewed in Epic    ASSESSMENT/PLAN:   Dawson was seen today for physical and health maintenance.    Diagnoses and all orders for this visit:    Routine general medical examination at a health care facility    Screen for colon cancer  -     GASTROENTEROLOGY ADULT REF PROCEDURE ONLY; Future    Erectile dysfunction, unspecified erectile dysfunction type  -     sildenafil (REVATIO) 20 MG tablet; 1 po 1 hour prior to intercourse    Screening for prostate cancer  -     Prostate spec antigen screen; Future    Hyperlipidemia LDL goal <100  -     Lipid panel reflex to direct LDL Fasting; Future    Screening for diabetes mellitus  -     Hemoglobin A1c; Future    Fatigue, unspecified type  -     Comprehensive metabolic panel; Future  -     TSH with free T4 reflex; Future  -     CBC with platelets differential; Future    Anal irritation    discussed several issues with patient  Check labs   Okay to use the hemorrhoid ointment  Patient to call and schedule colonoscopy  Refill med    COUNSELING:  Reviewed preventive health counseling, as reflected in patient instructions       Regular exercise       Healthy diet/nutrition       Safe sex practices/STD prevention       Colon cancer screening       Prostate cancer screening    Estimated body mass index is 25.46 kg/m  as calculated from the following:    Height as of this encounter: 1.89 m (6' 2.41\").    Weight as of this encounter: 90.9 kg (200 lb 8 oz).    Weight management plan: Discussed healthy diet and exercise guidelinesbut bmi not bad at all     reports that he has never smoked. He has never used smokeless tobacco.      Counseling Resources:  ATP IV Guidelines  Pooled Cohorts Equation Calculator  FRAX Risk Assessment  ICSI Preventive Guidelines  Dietary Guidelines for Americans, 2010  USDA's " MyPlate  ASA Prophylaxis  Lung CA Screening    Kaiden Emmanuel MD  [facility]

## 2020-07-17 NOTE — PATIENT INSTRUCTIONS
Preventive Health Recommendations  Male Ages 50 - 64    Yearly exam:             See your health care provider every year in order to  o   Review health changes.   o   Discuss preventive care.    o   Review your medicines if your doctor has prescribed any.     Have a cholesterol test every 5 years, or more frequently if you are at risk for high cholesterol/heart disease.     Have a diabetes test (fasting glucose) every three years. If you are at risk for diabetes, you should have this test more often.     Have a colonoscopy at age 50, or have a yearly FIT test (stool test). These exams will check for colon cancer.      Talk with your health care provider about whether or not a prostate cancer screening test (PSA) is right for you.    You should be tested each year for STDs (sexually transmitted diseases), if you re at risk.     Shots: Get a flu shot each year. Get a tetanus shot every 10 years.     Nutrition:    Eat at least 5 servings of fruits and vegetables daily.     Eat whole-grain bread, whole-wheat pasta and brown rice instead of white grains and rice.     Get adequate Calcium and Vitamin D.     Lifestyle    Exercise for at least 150 minutes a week (30 minutes a day, 5 days a week). This will help you control your weight and prevent disease.     Limit alcohol to one drink per day.     No smoking.     Wear sunscreen to prevent skin cancer.     See your dentist every six months for an exam and cleaning.     See your eye doctor every 1 to 2 years.      Keep working on healthy diet/exercise     Call to schedule colonoscopy    Fasting lab appointment, call to schedule    Preparation H okay as needed    Call with problems/ questions

## 2020-07-17 NOTE — TELEPHONE ENCOUNTER
Prior Authorization Retail Medication Request    Medication/Dose: sildenafil (REVATIO) 20 MG tablet   ICD code (if different than what is on RX): Erectile dysfunction, unspecified erectile dysfunction type (N52.9)   Previously Tried and Failed:   Rationale:      Insurance Name: [employer]   Insurance ID: [id number]      Pharmacy Information (if different than what is on RX)  Name: Not indicated   Phone: Not indicated

## 2020-07-17 NOTE — TELEPHONE ENCOUNTER
Central Prior Authorization Team   Phone: [phone]      PA Initiation    Medication: sildenafil (REVATIO) 20 MG tablet -Initiated  Insurance Company: [employer] - Phone [phone] Fax [phone]  Pharmacy Filling the Rx:    Filling Pharmacy Phone:    Filling Pharmacy Fax:    Start Date: 7/17/2020

## 2020-07-20 NOTE — TELEPHONE ENCOUNTER
PRIOR AUTHORIZATION DENIED    Medication: sildenafil (REVATIO) 20 MG tablet -DENIED    Denial Date: 7/18/2020    Denial Rational: Medication is covered when prescribed for PAH (WHO Group 1 PAH) or secondary Raynaud's phenomenon, not associated diagnosis.      Appeal Information:

## 2020-07-20 NOTE — TELEPHONE ENCOUNTER
PA was DENIED.    Do you want to Appeal? If so please write a letter explaining why the patient needs to be on this medication. Then route this encounter with the letter to ANAMIKA SINGH MEDMax Delacruz MA

## 2020-07-21 NOTE — TELEPHONE ENCOUNTER
I called and spoke to patient and informed him of message below. He is ok with this and states he had to pay out of pocket last time too  Francesca Coe CMA

## 2020-07-21 NOTE — TELEPHONE ENCOUNTER
Please inform patient that insurance will not pay for medication.  He can still get it but will have to pay out of pocket. He can shop around to different pharmacies.    Kaiden Emmanuel MD

## 2020-07-24 DIAGNOSIS — Z12.5 SCREENING FOR PROSTATE CANCER: ICD-10-CM

## 2020-07-24 DIAGNOSIS — E78.5 HYPERLIPIDEMIA LDL GOAL <100: ICD-10-CM

## 2020-07-24 DIAGNOSIS — R53.83 FATIGUE, UNSPECIFIED TYPE: ICD-10-CM

## 2020-07-24 DIAGNOSIS — Z13.1 SCREENING FOR DIABETES MELLITUS: ICD-10-CM

## 2020-07-24 LAB
ALBUMIN SERPL-MCNC: 3.9 G/DL (ref 3.4–5)
ALP SERPL-CCNC: 74 U/L (ref 40–150)
ALT SERPL W P-5'-P-CCNC: 24 U/L (ref 0–70)
ANION GAP SERPL CALCULATED.3IONS-SCNC: 7 MMOL/L (ref 3–14)
AST SERPL W P-5'-P-CCNC: 17 U/L (ref 0–45)
BASOPHILS # BLD AUTO: 0 10E9/L (ref 0–0.2)
BASOPHILS NFR BLD AUTO: 0.4 %
BILIRUB SERPL-MCNC: 1 MG/DL (ref 0.2–1.3)
BUN SERPL-MCNC: 11 MG/DL (ref 7–30)
CALCIUM SERPL-MCNC: 8.8 MG/DL (ref 8.5–10.1)
CHLORIDE SERPL-SCNC: 105 MMOL/L (ref 94–109)
CHOLEST SERPL-MCNC: 189 MG/DL
CO2 SERPL-SCNC: 27 MMOL/L (ref 20–32)
CREAT SERPL-MCNC: 0.96 MG/DL (ref 0.66–1.25)
DIFFERENTIAL METHOD BLD: NORMAL
EOSINOPHIL # BLD AUTO: 0.1 10E9/L (ref 0–0.7)
EOSINOPHIL NFR BLD AUTO: 2.7 %
ERYTHROCYTE [DISTWIDTH] IN BLOOD BY AUTOMATED COUNT: 13.2 % (ref 10–15)
GFR SERPL CREATININE-BSD FRML MDRD: 84 ML/MIN/{1.73_M2}
GLUCOSE SERPL-MCNC: 88 MG/DL (ref 70–99)
HBA1C MFR BLD: 5.7 % (ref 0–5.6)
HCT VFR BLD AUTO: 47.3 % (ref 40–53)
HDLC SERPL-MCNC: 54 MG/DL
HGB BLD-MCNC: 16.2 G/DL (ref 13.3–17.7)
LDLC SERPL CALC-MCNC: 109 MG/DL
LYMPHOCYTES # BLD AUTO: 1 10E9/L (ref 0.8–5.3)
LYMPHOCYTES NFR BLD AUTO: 21.6 %
MCH RBC QN AUTO: 30.6 PG (ref 26.5–33)
MCHC RBC AUTO-ENTMCNC: 34.2 G/DL (ref 31.5–36.5)
MCV RBC AUTO: 89 FL (ref 78–100)
MONOCYTES # BLD AUTO: 0.5 10E9/L (ref 0–1.3)
MONOCYTES NFR BLD AUTO: 11 %
NEUTROPHILS # BLD AUTO: 2.9 10E9/L (ref 1.6–8.3)
NEUTROPHILS NFR BLD AUTO: 64.3 %
NONHDLC SERPL-MCNC: 135 MG/DL
PLATELET # BLD AUTO: 154 10E9/L (ref 150–450)
POTASSIUM SERPL-SCNC: 3.7 MMOL/L (ref 3.4–5.3)
PROT SERPL-MCNC: 7.5 G/DL (ref 6.8–8.8)
PSA SERPL-ACNC: 7.05 UG/L (ref 0–4)
RBC # BLD AUTO: 5.29 10E12/L (ref 4.4–5.9)
SODIUM SERPL-SCNC: 139 MMOL/L (ref 133–144)
TRIGL SERPL-MCNC: 132 MG/DL
TSH SERPL DL<=0.005 MIU/L-ACNC: 1.34 MU/L (ref 0.4–4)
WBC # BLD AUTO: 4.5 10E9/L (ref 4–11)

## 2020-07-24 PROCEDURE — 80050 GENERAL HEALTH PANEL: CPT | Performed by: FAMILY MEDICINE

## 2020-07-24 PROCEDURE — 80061 LIPID PANEL: CPT | Performed by: FAMILY MEDICINE

## 2020-07-24 PROCEDURE — G0103 PSA SCREENING: HCPCS | Performed by: FAMILY MEDICINE

## 2020-07-24 PROCEDURE — 36415 COLL VENOUS BLD VENIPUNCTURE: CPT | Performed by: FAMILY MEDICINE

## 2020-07-24 PROCEDURE — 83036 HEMOGLOBIN GLYCOSYLATED A1C: CPT | Performed by: FAMILY MEDICINE

## 2020-07-26 NOTE — RESULT ENCOUNTER NOTE
"The hemoglobin a1c is barely into the \"prediabetes\" range.  No medications needed for this.  Just keep working on healthy diet/exercise.    Other labs are all stable.     Kaiden Emmanuel MD  "

## 2020-07-29 ENCOUNTER — TRANSFERRED RECORDS (OUTPATIENT)
Dept: HEALTH INFORMATION MANAGEMENT | Facility: CLINIC | Age: 60
End: 2020-07-29

## 2020-11-22 ENCOUNTER — HEALTH MAINTENANCE LETTER (OUTPATIENT)
Age: 60
End: 2020-11-22

## 2021-09-19 ENCOUNTER — HEALTH MAINTENANCE LETTER (OUTPATIENT)
Age: 61
End: 2021-09-19

## 2021-11-16 ASSESSMENT — ENCOUNTER SYMPTOMS
NERVOUS/ANXIOUS: 0
WEAKNESS: 0
HEADACHES: 0
FEVER: 0
HEMATURIA: 0
CONSTIPATION: 0
FREQUENCY: 1
SORE THROAT: 0
ARTHRALGIAS: 0
ABDOMINAL PAIN: 0
PARESTHESIAS: 0
CHILLS: 0
HEMATOCHEZIA: 0
DYSURIA: 0
SHORTNESS OF BREATH: 0
PALPITATIONS: 0
DIZZINESS: 0
DIARRHEA: 0
JOINT SWELLING: 0
COUGH: 0
MYALGIAS: 0
NAUSEA: 0
EYE PAIN: 0
HEARTBURN: 0

## 2021-11-17 ENCOUNTER — OFFICE VISIT (OUTPATIENT)
Dept: FAMILY MEDICINE | Facility: CLINIC | Age: 61
End: 2021-11-17
Payer: COMMERCIAL

## 2021-11-17 VITALS
HEIGHT: 75 IN | WEIGHT: 205 LBS | SYSTOLIC BLOOD PRESSURE: 122 MMHG | TEMPERATURE: 97.3 F | BODY MASS INDEX: 25.49 KG/M2 | HEART RATE: 82 BPM | DIASTOLIC BLOOD PRESSURE: 80 MMHG

## 2021-11-17 DIAGNOSIS — Z00.00 ROUTINE GENERAL MEDICAL EXAMINATION AT A HEALTH CARE FACILITY: Primary | ICD-10-CM

## 2021-11-17 DIAGNOSIS — R73.01 IMPAIRED FASTING GLUCOSE: ICD-10-CM

## 2021-11-17 DIAGNOSIS — N52.9 ERECTILE DYSFUNCTION, UNSPECIFIED ERECTILE DYSFUNCTION TYPE: ICD-10-CM

## 2021-11-17 DIAGNOSIS — Z23 ENCOUNTER FOR IMMUNIZATION: ICD-10-CM

## 2021-11-17 DIAGNOSIS — R07.9 CHEST PAIN, UNSPECIFIED TYPE: ICD-10-CM

## 2021-11-17 DIAGNOSIS — Z23 NEED FOR PROPHYLACTIC VACCINATION AND INOCULATION AGAINST INFLUENZA: ICD-10-CM

## 2021-11-17 DIAGNOSIS — Z12.5 SCREENING FOR PROSTATE CANCER: ICD-10-CM

## 2021-11-17 DIAGNOSIS — E78.5 HYPERLIPIDEMIA LDL GOAL <100: ICD-10-CM

## 2021-11-17 DIAGNOSIS — M54.9 BACK PAIN, UNSPECIFIED BACK LOCATION, UNSPECIFIED BACK PAIN LATERALITY, UNSPECIFIED CHRONICITY: ICD-10-CM

## 2021-11-17 DIAGNOSIS — R53.83 FATIGUE, UNSPECIFIED TYPE: ICD-10-CM

## 2021-11-17 PROCEDURE — 90682 RIV4 VACC RECOMBINANT DNA IM: CPT | Performed by: FAMILY MEDICINE

## 2021-11-17 PROCEDURE — 99396 PREV VISIT EST AGE 40-64: CPT | Mod: 25 | Performed by: FAMILY MEDICINE

## 2021-11-17 PROCEDURE — 90714 TD VACC NO PRESV 7 YRS+ IM: CPT | Performed by: FAMILY MEDICINE

## 2021-11-17 PROCEDURE — 90471 IMMUNIZATION ADMIN: CPT | Performed by: FAMILY MEDICINE

## 2021-11-17 PROCEDURE — 90472 IMMUNIZATION ADMIN EACH ADD: CPT | Performed by: FAMILY MEDICINE

## 2021-11-17 PROCEDURE — 99213 OFFICE O/P EST LOW 20 MIN: CPT | Mod: 25 | Performed by: FAMILY MEDICINE

## 2021-11-17 RX ORDER — SILDENAFIL CITRATE 20 MG/1
TABLET ORAL
Qty: 30 TABLET | Refills: 0 | Status: SHIPPED | OUTPATIENT
Start: 2021-11-17 | End: 2023-12-01

## 2021-11-17 RX ORDER — TADALAFIL 10 MG/1
TABLET ORAL
Qty: 6 TABLET | Refills: 1 | Status: SHIPPED | OUTPATIENT
Start: 2021-11-17 | End: 2022-07-21

## 2021-11-17 RX ORDER — FAMOTIDINE 20 MG/1
TABLET, FILM COATED ORAL
COMMUNITY

## 2021-11-17 ASSESSMENT — ENCOUNTER SYMPTOMS
HEMATURIA: 0
DYSURIA: 0
MYALGIAS: 0
COUGH: 0
SHORTNESS OF BREATH: 0
HEADACHES: 0
NAUSEA: 0
PALPITATIONS: 0
CONSTIPATION: 0
HEARTBURN: 0
PARESTHESIAS: 0
WEAKNESS: 0
CHILLS: 0
HEMATOCHEZIA: 0
JOINT SWELLING: 0
FEVER: 0
ABDOMINAL PAIN: 0
ARTHRALGIAS: 0
DIZZINESS: 0
FREQUENCY: 1
DIARRHEA: 0
EYE PAIN: 0
SORE THROAT: 0
NERVOUS/ANXIOUS: 0

## 2021-11-17 ASSESSMENT — MIFFLIN-ST. JEOR: SCORE: 1807.37

## 2021-11-17 NOTE — PROGRESS NOTES
SUBJECTIVE:   CC: Dawson Garcia is an 61 year old male who presents for preventative health visit.        Patient has been advised of split billing requirements and indicates understanding: Yes  Healthy Habits:     Getting at least 3 servings of Calcium per day:  Yes    Bi-annual eye exam:  Yes    Dental care twice a year:  Yes    Sleep apnea or symptoms of sleep apnea:  Daytime drowsiness    Diet:  Regular (no restrictions)    Frequency of exercise:  2-3 days/week    Duration of exercise:  15-30 minutes    Taking medications regularly:  Yes    Medication side effects:  Other    PHQ-2 Total Score: 1               Today's PHQ-2 Score:   PHQ-2 ( 1999 [employer]) 11/16/2021   Q1: Little interest or pleasure in doing things 0   Q2: Feeling down, depressed or hopeless 1   PHQ-2 Score 1   Q1: Little interest or pleasure in doing things Not at all   Q2: Feeling down, depressed or hopeless Several days   PHQ-2 Score 1       Abuse: Current or Past(Physical, Sexual or Emotional)- No  Do you feel safe in your environment? Yes        Social History     Tobacco Use     Smoking status: Never Smoker     Smokeless tobacco: Never Used   Substance Use Topics     Alcohol use: Yes     Comment: sometimes     If you drink alcohol do you typically have >3 drinks per day or >7 drinks per week? Yes         AUDIT - Alcohol Use Disorders Identification Test - Reproduced from the World Health Organization Audit 2001 (Second Edition) 11/16/2021   1.  How often do you have a drink containing alcohol? 4 or more times a week   2.  How many drinks containing alcohol do you have on a typical day when you are drinking? 1 or 2   3.  How often do you have five or more drinks on one occasion? Never   4.  How often during the last year have you found that you were not able to stop drinking once you had started? Never   5.  How often during the last year have you failed to do what was normally expected of you because of drinking? Never   6.  How often during  the last year have you needed a first drink in the morning to get yourself going after a heavy drinking session? Never   7.  How often during the last year have you had a feeling of guilt or remorse after drinking? Never   8.  How often during the last year have you been unable to remember what happened the night before because of your drinking? Never   9.  Have you or someone else been injured because of your drinking? No   10. Has a relative, friend, doctor or other health care worker been concerned about your drinking or suggested you cut down? No   TOTAL SCORE 4       Last PSA:   PSA   Date Value Ref Range Status   07/24/2020 7.05 (H) 0 - 4 ug/L Final     Comment:     Assay Method:  Chemiluminescence using Siemens Vista analyzer       Reviewed orders with patient. Reviewed health maintenance and updated orders accordingly - Yes       Reviewed and updated as needed this visit by clinical staff  Tobacco  Allergies  Meds   Med Hx  Surg Hx  Fam Hx  Soc Hx       Reviewed and updated as needed this visit by Provider                   Review of Systems   Constitutional: Negative for chills and fever.   HENT: Negative for congestion, ear pain, hearing loss and sore throat.    Eyes: Negative for pain and visual disturbance.   Respiratory: Negative for cough and shortness of breath.    Cardiovascular: Negative for chest pain, palpitations and peripheral edema.   Gastrointestinal: Negative for abdominal pain, constipation, diarrhea, heartburn, hematochezia and nausea.   Genitourinary: Positive for frequency, impotence and urgency. Negative for dysuria, genital sores, hematuria and penile discharge.   Musculoskeletal: Negative for arthralgias, joint swelling and myalgias.   Skin: Negative for rash.   Neurological: Negative for dizziness, weakness, headaches and paresthesias.   Psychiatric/Behavioral: Negative for mood changes. The patient is not nervous/anxious.      No hemorrhoid issues recently    2x at night to  "urinate    Working from home    Stream variable    Sometimes takes a while    Ran out of the sildenafil    A little foggy the am after using it    Some exercise  Walking some        OBJECTIVE:   /80 (BP Location: Left arm, Patient Position: Chair, Cuff Size: Adult Regular)   Pulse 82   Temp 97.3  F (36.3  C) (Temporal)   Ht 1.9 m (6' 2.8\")   Wt 93 kg (205 lb)   BMI 25.76 kg/m      Physical Exam  GENERAL: healthy, alert and no distress  EYES: Eyes grossly normal to inspection, PERRL and conjunctivae and sclerae normal  HENT: ear canals and TM's normal, nose and mouth without ulcers or lesions  NECK: no adenopathy, no asymmetry, masses, or scars and thyroid normal to palpation  RESP: lungs clear to auscultation - no rales, rhonchi or wheezes  CV: regular rate and rhythm, normal S1 S2, no S3 or S4, no murmur, click or rub, no peripheral edema and peripheral pulses strong  ABDOMEN: soft, nontender, no hepatosplenomegaly, no masses and bowel sounds normal  MS: no gross musculoskeletal defects noted, no edema  SKIN: no suspicious lesions or rashes  NEURO: Normal strength and tone, mentation intact and speech normal  PSYCH: mentation appears normal, affect normal/bright    Diagnostic Test Results:  Labs reviewed in Epic    ASSESSMENT/PLAN:   Dawson was seen today for physical and health maintenance.    Diagnoses and all orders for this visit:    Routine general medical examination at a health care facility    Erectile dysfunction, unspecified erectile dysfunction type  -     sildenafil (REVATIO) 20 MG tablet; TAKE 1 TABLET BY MOUTH 1 HOUR BEFORE INTERCOURSE  -     tadalafil (CIALIS) 10 MG tablet; 1/2 to 1 po up to once daily as needed for exercise    Chest pain, unspecified type  -     Echocardiogram Exercise Stress; Future    Hyperlipidemia LDL goal <100  -     Lipid panel reflex to direct LDL Fasting; Future  -     Comprehensive metabolic panel; Future    Impaired fasting glucose  -     Hemoglobin A1c; " "Future    Encounter for immunization  -     TD PRESERV FREE, IM (7+ YRS) (DECAVAC/TENIVAC)  -     SCREENING QUESTIONS FOR ADULT IMMUNIZATIONS    Screening for prostate cancer  -     Prostate Specific Antigen Screen; Future    Fatigue, unspecified type  -     TSH with free T4 reflex; Future  -     CBC with Platelets & Differential; Future    Other orders  -     REVIEW OF HEALTH MAINTENANCE PROTOCOL ORDERS      Discussed multiple issues with patient  He has some mild chest tightness occasionally with walking; prudent to make sure heart okay  Patient to schedule stress echo  If this is fine, then increase exercise and also could fill one of the ED meds ( gave paper prescriptions of both)  Mildly high LDL; patient to schedule fasting lab appointment  Also recheck hemoglobin a1c; at one point barely into prediabetes range  For back pain advised active stretching/ strengthening approach  Td and flu shots given    Patient has been advised of split billing requirements and indicates understanding: Yes  COUNSELING:   Reviewed preventive health counseling, as reflected in patient instructions       Regular exercise       Healthy diet/nutrition       Vision screening       Safe sex practices/STD prevention       Colon cancer screening       Prostate cancer screening    Estimated body mass index is 25.76 kg/m  as calculated from the following:    Height as of this encounter: 1.9 m (6' 2.8\").    Weight as of this encounter: 93 kg (205 lb).         He reports that he has never smoked. He has never used smokeless tobacco.      Counseling Resources:  ATP IV Guidelines  Pooled Cohorts Equation Calculator  FRAX Risk Assessment  ICSI Preventive Guidelines  Dietary Guidelines for Americans, 2010  USDA's MyPlate  ASA Prophylaxis  Lung CA Screening    Kaiden Emmanuel MD  [facility]  "

## 2021-11-17 NOTE — PATIENT INSTRUCTIONS
Preventive Health Recommendations  Male Ages 50 - 64    Yearly exam:             See your health care provider every year in order to  o   Review health changes.   o   Discuss preventive care.    o   Review your medicines if your doctor has prescribed any.     Have a cholesterol test every 5 years, or more frequently if you are at risk for high cholesterol/heart disease.     Have a diabetes test (fasting glucose) every three years. If you are at risk for diabetes, you should have this test more often.     Have a colonoscopy at age 50, or have a yearly FIT test (stool test). These exams will check for colon cancer.      Talk with your health care provider about whether or not a prostate cancer screening test (PSA) is right for you.    You should be tested each year for STDs (sexually transmitted diseases), if you re at risk.     Shots: Get a flu shot each year. Get a tetanus shot every 10 years.     Nutrition:    Eat at least 5 servings of fruits and vegetables daily.     Eat whole-grain bread, whole-wheat pasta and brown rice instead of white grains and rice.     Get adequate Calcium and Vitamin D.     Lifestyle    Exercise for at least 150 minutes a week (30 minutes a day, 5 days a week). This will help you control your weight and prevent disease.     Limit alcohol to one drink per day.     No smoking.     Wear sunscreen to prevent skin cancer.     See your dentist every six months for an exam and cleaning.     See your eye doctor every 1 to 2 years.        Schedule stress echo ( heart test )    If this is normal, then could fill one of the erectile dysfunction meds    Also then increase exercise    Stretching/ strengthening for back    Schedule fasting lab appointment

## 2021-11-26 ENCOUNTER — LAB (OUTPATIENT)
Dept: LAB | Facility: CLINIC | Age: 61
End: 2021-11-26
Payer: COMMERCIAL

## 2021-11-26 DIAGNOSIS — Z12.5 SCREENING FOR PROSTATE CANCER: ICD-10-CM

## 2021-11-26 DIAGNOSIS — E78.5 HYPERLIPIDEMIA LDL GOAL <100: ICD-10-CM

## 2021-11-26 DIAGNOSIS — R53.83 FATIGUE, UNSPECIFIED TYPE: ICD-10-CM

## 2021-11-26 DIAGNOSIS — R73.01 IMPAIRED FASTING GLUCOSE: ICD-10-CM

## 2021-11-26 LAB
ALBUMIN SERPL-MCNC: 4 G/DL (ref 3.4–5)
ALP SERPL-CCNC: 71 U/L (ref 40–150)
ALT SERPL W P-5'-P-CCNC: 28 U/L (ref 0–70)
ANION GAP SERPL CALCULATED.3IONS-SCNC: 3 MMOL/L (ref 3–14)
AST SERPL W P-5'-P-CCNC: 19 U/L (ref 0–45)
BASOPHILS # BLD AUTO: 0 10E3/UL (ref 0–0.2)
BASOPHILS NFR BLD AUTO: 0 %
BILIRUB SERPL-MCNC: 0.9 MG/DL (ref 0.2–1.3)
BUN SERPL-MCNC: 14 MG/DL (ref 7–30)
CALCIUM SERPL-MCNC: 9.4 MG/DL (ref 8.5–10.1)
CHLORIDE BLD-SCNC: 104 MMOL/L (ref 94–109)
CHOLEST SERPL-MCNC: 201 MG/DL
CO2 SERPL-SCNC: 30 MMOL/L (ref 20–32)
CREAT SERPL-MCNC: 0.99 MG/DL (ref 0.66–1.25)
EOSINOPHIL # BLD AUTO: 0.2 10E3/UL (ref 0–0.7)
EOSINOPHIL NFR BLD AUTO: 4 %
ERYTHROCYTE [DISTWIDTH] IN BLOOD BY AUTOMATED COUNT: 13.3 % (ref 10–15)
FASTING STATUS PATIENT QL REPORTED: YES
GFR SERPL CREATININE-BSD FRML MDRD: 81 ML/MIN/1.73M2
GLUCOSE BLD-MCNC: 98 MG/DL (ref 70–99)
HBA1C MFR BLD: 5.6 % (ref 0–5.6)
HCT VFR BLD AUTO: 47.3 % (ref 40–53)
HDLC SERPL-MCNC: 52 MG/DL
HGB BLD-MCNC: 15.6 G/DL (ref 13.3–17.7)
LDLC SERPL CALC-MCNC: 116 MG/DL
LYMPHOCYTES # BLD AUTO: 1 10E3/UL (ref 0.8–5.3)
LYMPHOCYTES NFR BLD AUTO: 21 %
MCH RBC QN AUTO: 30.2 PG (ref 26.5–33)
MCHC RBC AUTO-ENTMCNC: 33 G/DL (ref 31.5–36.5)
MCV RBC AUTO: 92 FL (ref 78–100)
MONOCYTES # BLD AUTO: 0.5 10E3/UL (ref 0–1.3)
MONOCYTES NFR BLD AUTO: 10 %
NEUTROPHILS # BLD AUTO: 3.1 10E3/UL (ref 1.6–8.3)
NEUTROPHILS NFR BLD AUTO: 64 %
NONHDLC SERPL-MCNC: 149 MG/DL
PLATELET # BLD AUTO: 160 10E3/UL (ref 150–450)
POTASSIUM BLD-SCNC: 4.1 MMOL/L (ref 3.4–5.3)
PROT SERPL-MCNC: 7.8 G/DL (ref 6.8–8.8)
PSA SERPL-MCNC: 8.58 UG/L (ref 0–4)
RBC # BLD AUTO: 5.17 10E6/UL (ref 4.4–5.9)
SODIUM SERPL-SCNC: 137 MMOL/L (ref 133–144)
TRIGL SERPL-MCNC: 164 MG/DL
TSH SERPL DL<=0.005 MIU/L-ACNC: 0.96 MU/L (ref 0.4–4)
WBC # BLD AUTO: 4.8 10E3/UL (ref 4–11)

## 2021-11-26 PROCEDURE — 80061 LIPID PANEL: CPT

## 2021-11-26 PROCEDURE — 83036 HEMOGLOBIN GLYCOSYLATED A1C: CPT

## 2021-11-26 PROCEDURE — 80050 GENERAL HEALTH PANEL: CPT

## 2021-11-26 PROCEDURE — G0103 PSA SCREENING: HCPCS

## 2021-11-26 PROCEDURE — 36415 COLL VENOUS BLD VENIPUNCTURE: CPT

## 2021-11-28 NOTE — RESULT ENCOUNTER NOTE
The prostate blood test is up some in the last 1 1/2 years but not a real big increase.  Reasonable to recheck in 6-12 months.    Cholesterol is mildly elevated.    Hemoglobin a1c is back into the normal range, so no diabetes or prediabetes.    Other labs are okay.    Kaiden Emmanuel MD

## 2021-11-30 ENCOUNTER — TELEPHONE (OUTPATIENT)
Dept: FAMILY MEDICINE | Facility: CLINIC | Age: 61
End: 2021-11-30

## 2021-11-30 ENCOUNTER — ANCILLARY PROCEDURE (OUTPATIENT)
Dept: CARDIOLOGY | Facility: CLINIC | Age: 61
End: 2021-11-30
Attending: FAMILY MEDICINE
Payer: COMMERCIAL

## 2021-11-30 DIAGNOSIS — R07.9 CHEST PAIN, UNSPECIFIED TYPE: ICD-10-CM

## 2021-11-30 PROCEDURE — 93350 STRESS TTE ONLY: CPT | Mod: 26 | Performed by: INTERNAL MEDICINE

## 2021-11-30 PROCEDURE — 93350 STRESS TTE ONLY: CPT | Mod: TC | Performed by: INTERNAL MEDICINE

## 2021-11-30 PROCEDURE — 93017 CV STRESS TEST TRACING ONLY: CPT | Performed by: INTERNAL MEDICINE

## 2021-11-30 PROCEDURE — 93321 DOPPLER ECHO F-UP/LMTD STD: CPT | Mod: 26 | Performed by: INTERNAL MEDICINE

## 2021-11-30 PROCEDURE — 93016 CV STRESS TEST SUPVJ ONLY: CPT | Performed by: INTERNAL MEDICINE

## 2021-11-30 PROCEDURE — 99207 PR STATISTIC IV PUSH SINGLE INITIAL SUBSTANCE: CPT | Performed by: INTERNAL MEDICINE

## 2021-11-30 PROCEDURE — 93325 DOPPLER ECHO COLOR FLOW MAPG: CPT | Mod: 26 | Performed by: INTERNAL MEDICINE

## 2021-11-30 PROCEDURE — 93321 DOPPLER ECHO F-UP/LMTD STD: CPT | Mod: TC | Performed by: INTERNAL MEDICINE

## 2021-11-30 PROCEDURE — 93325 DOPPLER ECHO COLOR FLOW MAPG: CPT | Mod: TC | Performed by: INTERNAL MEDICINE

## 2021-11-30 PROCEDURE — 93018 CV STRESS TEST I&R ONLY: CPT | Performed by: INTERNAL MEDICINE

## 2021-11-30 PROCEDURE — 93352 ADMIN ECG CONTRAST AGENT: CPT | Performed by: INTERNAL MEDICINE

## 2021-11-30 RX ADMIN — Medication 3 ML: at 08:58

## 2021-12-01 NOTE — TELEPHONE ENCOUNTER
I tried to call patient with results of stress echo and labs.  Patient not available.  Will do result note for stress test.  Kaiden Emmanuel MD

## 2021-12-01 NOTE — RESULT ENCOUNTER NOTE
I tried to call with these results.    Good news.  Heart stress test is normal.    Increase execise as able.    Kaiden Emmanuel MD

## 2021-12-08 NOTE — TELEPHONE ENCOUNTER
I called and discussed in detail with patient  Offered urology consult but he felt comfortable rechecking psa in a year  He did see urology and get a low probability mri prostate several years ago  Not a big percentage increase in psa since then  Other labs and heart test maggie Emmanuel MD

## 2021-12-16 ENCOUNTER — IMMUNIZATION (OUTPATIENT)
Dept: NURSING | Facility: CLINIC | Age: 61
End: 2021-12-16
Payer: COMMERCIAL

## 2021-12-16 PROCEDURE — 0004A PR COVID VAC PFIZER DIL RECON 30 MCG/0.3 ML IM: CPT

## 2021-12-16 PROCEDURE — 91300 PR COVID VAC PFIZER DIL RECON 30 MCG/0.3 ML IM: CPT

## 2022-05-09 ENCOUNTER — IMMUNIZATION (OUTPATIENT)
Dept: NURSING | Facility: CLINIC | Age: 62
End: 2022-05-09
Payer: COMMERCIAL

## 2022-05-09 PROCEDURE — 0054A COVID-19,PF,PFIZER (12+ YRS): CPT

## 2022-05-09 PROCEDURE — 91305 COVID-19,PF,PFIZER (12+ YRS): CPT

## 2022-07-21 ENCOUNTER — OFFICE VISIT (OUTPATIENT)
Dept: OPHTHALMOLOGY | Facility: CLINIC | Age: 62
End: 2022-07-21
Payer: COMMERCIAL

## 2022-07-21 DIAGNOSIS — Z01.00 EXAMINATION OF EYES AND VISION: ICD-10-CM

## 2022-07-21 DIAGNOSIS — H52.4 PRESBYOPIA: ICD-10-CM

## 2022-07-21 DIAGNOSIS — H43.823 VITREOMACULAR TRACTION SYNDROME OF BOTH EYES: Primary | ICD-10-CM

## 2022-07-21 PROCEDURE — 92004 COMPRE OPH EXAM NEW PT 1/>: CPT | Performed by: OPHTHALMOLOGY

## 2022-07-21 PROCEDURE — 92134 CPTRZ OPH DX IMG PST SGM RTA: CPT | Performed by: OPHTHALMOLOGY

## 2022-07-21 PROCEDURE — 92015 DETERMINE REFRACTIVE STATE: CPT | Performed by: OPHTHALMOLOGY

## 2022-07-21 ASSESSMENT — REFRACTION_WEARINGRX
OS_SPHERE: -0.50
OD_ADD: +2.25
OD_CYLINDER: SPHERE
OS_CYLINDER: SPHERE
OS_ADD: +2.25
SPECS_TYPE: TRIFOCAL
OD_SPHERE: -0.50

## 2022-07-21 ASSESSMENT — REFRACTION_MANIFEST
OS_SPHERE: -0.50
OD_CYLINDER: +1.00
OD_SPHERE: PLANO
OD_AXIS: 179
OS_SPHERE: +0.50
OS_CYLINDER: SPHERE
OS_CYLINDER: SPHERE
OS_ADD: +2.50
OD_ADD: +2.50
OD_CYLINDER: +1.00
OD_AXIS: 179
OD_SPHERE: +1.00

## 2022-07-21 ASSESSMENT — VISUAL ACUITY
OD_CC: J1+ BE
OS_CC: 20/20
OD_CC: 20/50
OD_PH_CC: 20/20
OD_CC+: +2
CORRECTION_TYPE: GLASSES
METHOD: SNELLEN - LINEAR

## 2022-07-21 ASSESSMENT — CUP TO DISC RATIO
OS_RATIO: 0.3
OD_RATIO: 0.2

## 2022-07-21 ASSESSMENT — CONF VISUAL FIELD
OS_NORMAL: 1
OD_NORMAL: 1

## 2022-07-21 ASSESSMENT — SLIT LAMP EXAM - LIDS
COMMENTS: NORMAL
COMMENTS: NORMAL

## 2022-07-21 ASSESSMENT — TONOMETRY
OS_IOP_MMHG: 18
IOP_METHOD: APPLANATION
OD_IOP_MMHG: 18

## 2022-07-21 ASSESSMENT — EXTERNAL EXAM - LEFT EYE: OS_EXAM: 2+ BROW PTOSIS

## 2022-07-21 ASSESSMENT — EXTERNAL EXAM - RIGHT EYE: OD_EXAM: 2+ BROW PTOSIS

## 2022-07-21 NOTE — PATIENT INSTRUCTIONS
"Glasses prescription given - optional  Use artificial tears up to four times a day (like Refresh Optive, Systane Balance, TheraTears, or generic artificial tears are ok. Avoid \"get the red out\" drops).  Possible posterior vitreous detachment (sudden onset large floater and/or flashing lights) both eyes discussed.   Will obtain an OCT today - I will call if any concerns   Call in March 2023 for an appointment in July 2023 for Complete Exam    Dr. Hackett [phone]    "

## 2022-07-21 NOTE — PROGRESS NOTES
"Current Eye Medications:  None    Subjective:  Here for Comprehensive Eye Exam.  Last eye exam about 1 year ago.  He notes blurry vision on the right eye. Started about 1 month ago where he really started noticing the vision getting worse. He closes the right eye to see clearer. He notes there is a shadow with the right eye.     He denies pain/burning/tearing.   Some itching and dryness. Uses ATs rarely.      Floaters occasionally. No flashes of lights.     ENOC Turcios  8:29 AM 07/21/2022    Objective:  See Ophthalmology Exam.      Assessment:  Refractive shift right eye.  Baseline retinal OCT mostly within normal limits both eyes.     Plan:  Glasses prescription given - optional  Use artificial tears up to four times a day (like Refresh Optive, Systane Balance, TheraTears, or generic artificial tears are ok. Avoid \"get the red out\" drops).  Possible posterior vitreous detachment (sudden onset large floater and/or flashing lights) both eyes discussed.   Will obtain an OCT today - I will call if any concerns   Call in March 2023 for an appointment in July 2023 for Complete Exam    Dr. Hackett [phone]    "

## 2022-07-21 NOTE — LETTER
"    7/21/2022         RE: Dawson Garcia  [address]  [address]  [place] [facility] [place] [address]        Dear Colleague,    Thank you for referring your patient, Dawson Garcia, to the [facility]. Please see a copy of my visit note below.    Current Eye Medications:  None    Subjective:  Here for Comprehensive Eye Exam.  Last eye exam about 1 year ago.  He notes blurry vision on the right eye. Started about 1 month ago where he really started noticing the vision getting worse. He closes the right eye to see clearer. He notes there is a shadow with the right eye.     He denies pain/burning/tearing.   Some itching and dryness. Uses ATs rarely.      Floaters occasionally. No flashes of lights.     ENOC Turcios  8:29 AM 07/21/2022    Objective:  See Ophthalmology Exam.      Assessment:  Refractive shift right eye.  Baseline retinal OCT mostly within normal limits both eyes.     Plan:  Glasses prescription given - optional  Use artificial tears up to four times a day (like Refresh Optive, Systane Balance, TheraTears, or generic artificial tears are ok. Avoid \"get the red out\" drops).  Possible posterior vitreous detachment (sudden onset large floater and/or flashing lights) both eyes discussed.   Will obtain an OCT today - I will call if any concerns   Call in March 2023 for an appointment in July 2023 for Complete Exam    Dr. Hackett [phone]        Again, thank you for allowing me to participate in the care of your patient.        Sincerely,        Ryley Hackett MD    "

## 2022-08-08 ENCOUNTER — TELEPHONE (OUTPATIENT)
Dept: OPHTHALMOLOGY | Facility: CLINIC | Age: 62
End: 2022-08-08

## 2022-08-08 ENCOUNTER — OFFICE VISIT (OUTPATIENT)
Dept: OPHTHALMOLOGY | Facility: CLINIC | Age: 62
End: 2022-08-08
Payer: COMMERCIAL

## 2022-08-08 DIAGNOSIS — H52.4 PRESBYOPIA: Primary | ICD-10-CM

## 2022-08-08 PROCEDURE — 99207 PR NO CHARGE LOS: CPT | Performed by: OPHTHALMOLOGY

## 2022-08-08 ASSESSMENT — VISUAL ACUITY
METHOD: SNELLEN - LINEAR
OD_CC+: -1
OD_CC: 20/30
CORRECTION_TYPE: GLASSES
OS_CC: 20/20

## 2022-08-08 ASSESSMENT — REFRACTION_WEARINGRX
OS_CYLINDER: SPHERE
OS_SPHERE: +0.25
OD_CYLINDER: +1.00
OS_ADD: +2.75
OD_AXIS: 178
OD_CYLINDER: +0.50
OD_ADD: +2.75
OS_SPHERE: -0.75
OS_ADD: +1.50
OS_CYLINDER: SPHERE
OD_AXIS: 178
OD_SPHERE: -0.25
SPECS_TYPE: BIFOCAL
SPECS_TYPE: TRIFOCAL
OD_SPHERE: +0.75
OD_ADD: +1.50

## 2022-08-08 ASSESSMENT — REFRACTION_MANIFEST
OS_ADD: +1.50
OS_CYLINDER: SPHERE
OD_AXIS: 012
OD_SPHERE: PLANO
OS_SPHERE: -0.25
OD_CYLINDER: +1.00
OD_ADD: +1.50

## 2022-08-08 NOTE — TELEPHONE ENCOUNTER
Trifocal portion of glasses for computer is working great. The Bifocal portion of new glasses is too strong for looking at the keyboard which is further away than his reading. He can read fine with bifocal if holds close to his face, but not the 18 inches or so away that the keyboard is. Also having patient measure exact distance and he will bring with him today so we can measure where bifocal power should be.   Patient will come in today for glasses check for no charge.

## 2022-08-08 NOTE — TELEPHONE ENCOUNTER
Patient called stating that he has a few questions regarding his prescription. He would like a call back as soon as possible to discuss this at: [phone] when able. He stated he would like to discuss a few different details on his prescription that was recently written.    Thank You,    Refugio Rodriguez  Patient Rep

## 2022-08-08 NOTE — PROGRESS NOTES
" Current Eye Medications:       Subjective:  Patient is here for a Glasses Recheck.  Patient would like to have the bifocal focal length set to 23\", and distance portion to be for his monitor at 29\".  in the new glasses the \"distance\" portion for the monitor is good, but the bifocal is set too close, so he is unable to see his keyboard.     Objective:  See Ophthalmology Exam.       Assessment:  Bifocal is too strong for his needs.      Plan:  Doctor's change, both lenses. (Glasses are from [employer]).    Agree with tech assessment.  Ryley Hackett M.D.           "

## 2022-08-08 NOTE — LETTER
"    8/8/2022         RE: Dawson Garcia  [address]  [address]  [place] [address]        Dear Colleague,    Thank you for referring your patient, Dawson Garcia, to the [facility]. Please see a copy of my visit note below.     Current Eye Medications:       Subjective:  Patient is here for a Glasses Recheck.  Patient would like to have the bifocal focal length set to 23\", and distance portion to be for his monitor at 29\".  in the new glasses the \"distance\" portion for the monitor is good, but the bifocal is set too close, so he is unable to see his keyboard.     Objective:  See Ophthalmology Exam.       Assessment:  Bifocal is too strong for his needs.      Plan:  Doctor's change, both lenses. (Glasses are from [employer]).    Agree with tech assessment.  Ryley Hackett M.D.               Again, thank you for allowing me to participate in the care of your patient.        Sincerely,        Ryley Hackett MD    "

## 2022-10-21 ENCOUNTER — IMMUNIZATION (OUTPATIENT)
Dept: NURSING | Facility: CLINIC | Age: 62
End: 2022-10-21
Payer: COMMERCIAL

## 2022-10-21 PROCEDURE — 0124A COVID-19,PF,PFIZER BOOSTER BIVALENT: CPT

## 2022-10-21 PROCEDURE — 91312 COVID-19,PF,PFIZER BOOSTER BIVALENT: CPT

## 2022-10-21 PROCEDURE — 90682 RIV4 VACC RECOMBINANT DNA IM: CPT

## 2022-10-21 PROCEDURE — 90471 IMMUNIZATION ADMIN: CPT

## 2022-11-25 ASSESSMENT — ENCOUNTER SYMPTOMS
MYALGIAS: 0
CONSTIPATION: 0
SHORTNESS OF BREATH: 0
DIARRHEA: 0
FEVER: 0
PALPITATIONS: 0
WEAKNESS: 0
DIZZINESS: 0
EYE PAIN: 0
HEMATOCHEZIA: 0
HEADACHES: 0
FREQUENCY: 0
JOINT SWELLING: 0
ABDOMINAL PAIN: 1
NAUSEA: 0
CHILLS: 0
NERVOUS/ANXIOUS: 0
COUGH: 0
HEMATURIA: 0
PARESTHESIAS: 0
HEARTBURN: 0
ARTHRALGIAS: 0
SORE THROAT: 0
DYSURIA: 0

## 2022-11-30 ENCOUNTER — OFFICE VISIT (OUTPATIENT)
Dept: FAMILY MEDICINE | Facility: CLINIC | Age: 62
End: 2022-11-30
Payer: COMMERCIAL

## 2022-11-30 VITALS
OXYGEN SATURATION: 100 % | HEIGHT: 74 IN | WEIGHT: 213.13 LBS | HEART RATE: 78 BPM | TEMPERATURE: 97.9 F | DIASTOLIC BLOOD PRESSURE: 82 MMHG | BODY MASS INDEX: 27.35 KG/M2 | SYSTOLIC BLOOD PRESSURE: 131 MMHG

## 2022-11-30 DIAGNOSIS — Z86.0100 HISTORY OF COLONIC POLYPS: ICD-10-CM

## 2022-11-30 DIAGNOSIS — Z00.00 ROUTINE GENERAL MEDICAL EXAMINATION AT A HEALTH CARE FACILITY: Primary | ICD-10-CM

## 2022-11-30 DIAGNOSIS — Z12.5 SCREENING FOR PROSTATE CANCER: ICD-10-CM

## 2022-11-30 DIAGNOSIS — R73.01 IMPAIRED FASTING GLUCOSE: ICD-10-CM

## 2022-11-30 DIAGNOSIS — E78.5 HYPERLIPIDEMIA LDL GOAL <100: ICD-10-CM

## 2022-11-30 DIAGNOSIS — R53.83 FATIGUE, UNSPECIFIED TYPE: ICD-10-CM

## 2022-11-30 DIAGNOSIS — R97.20 ELEVATED PROSTATE SPECIFIC ANTIGEN (PSA): ICD-10-CM

## 2022-11-30 LAB
ALBUMIN SERPL-MCNC: 3.8 G/DL (ref 3.4–5)
ALP SERPL-CCNC: 76 U/L (ref 40–150)
ALT SERPL W P-5'-P-CCNC: 27 U/L (ref 0–70)
ANION GAP SERPL CALCULATED.3IONS-SCNC: 6 MMOL/L (ref 3–14)
AST SERPL W P-5'-P-CCNC: 20 U/L (ref 0–45)
BASOPHILS # BLD AUTO: 0 10E3/UL (ref 0–0.2)
BASOPHILS NFR BLD AUTO: 0 %
BILIRUB SERPL-MCNC: 0.6 MG/DL (ref 0.2–1.3)
BUN SERPL-MCNC: 14 MG/DL (ref 7–30)
CALCIUM SERPL-MCNC: 9.1 MG/DL (ref 8.5–10.1)
CHLORIDE BLD-SCNC: 107 MMOL/L (ref 94–109)
CHOLEST SERPL-MCNC: 228 MG/DL
CO2 SERPL-SCNC: 27 MMOL/L (ref 20–32)
CREAT SERPL-MCNC: 0.89 MG/DL (ref 0.66–1.25)
EOSINOPHIL # BLD AUTO: 0.1 10E3/UL (ref 0–0.7)
EOSINOPHIL NFR BLD AUTO: 2 %
ERYTHROCYTE [DISTWIDTH] IN BLOOD BY AUTOMATED COUNT: 13.6 % (ref 10–15)
FASTING STATUS PATIENT QL REPORTED: ABNORMAL
GFR SERPL CREATININE-BSD FRML MDRD: >90 ML/MIN/1.73M2
GLUCOSE BLD-MCNC: 110 MG/DL (ref 70–99)
HBA1C MFR BLD: 5.7 % (ref 0–5.6)
HCT VFR BLD AUTO: 44.8 % (ref 40–53)
HDLC SERPL-MCNC: 51 MG/DL
HGB BLD-MCNC: 15 G/DL (ref 13.3–17.7)
LDLC SERPL CALC-MCNC: 128 MG/DL
LYMPHOCYTES # BLD AUTO: 1.1 10E3/UL (ref 0.8–5.3)
LYMPHOCYTES NFR BLD AUTO: 20 %
MCH RBC QN AUTO: 30.4 PG (ref 26.5–33)
MCHC RBC AUTO-ENTMCNC: 33.5 G/DL (ref 31.5–36.5)
MCV RBC AUTO: 91 FL (ref 78–100)
MONOCYTES # BLD AUTO: 0.7 10E3/UL (ref 0–1.3)
MONOCYTES NFR BLD AUTO: 13 %
NEUTROPHILS # BLD AUTO: 3.6 10E3/UL (ref 1.6–8.3)
NEUTROPHILS NFR BLD AUTO: 65 %
NONHDLC SERPL-MCNC: 177 MG/DL
PLATELET # BLD AUTO: 160 10E3/UL (ref 150–450)
POTASSIUM BLD-SCNC: 4 MMOL/L (ref 3.4–5.3)
PROT SERPL-MCNC: 7.4 G/DL (ref 6.8–8.8)
PSA SERPL-MCNC: 8.46 UG/L (ref 0–4)
RBC # BLD AUTO: 4.93 10E6/UL (ref 4.4–5.9)
SODIUM SERPL-SCNC: 140 MMOL/L (ref 133–144)
TRIGL SERPL-MCNC: 245 MG/DL
TSH SERPL DL<=0.005 MIU/L-ACNC: 0.83 MU/L (ref 0.4–4)
WBC # BLD AUTO: 5.5 10E3/UL (ref 4–11)

## 2022-11-30 PROCEDURE — 99396 PREV VISIT EST AGE 40-64: CPT | Performed by: FAMILY MEDICINE

## 2022-11-30 PROCEDURE — 36415 COLL VENOUS BLD VENIPUNCTURE: CPT | Performed by: FAMILY MEDICINE

## 2022-11-30 PROCEDURE — 80061 LIPID PANEL: CPT | Performed by: FAMILY MEDICINE

## 2022-11-30 PROCEDURE — 83036 HEMOGLOBIN GLYCOSYLATED A1C: CPT | Performed by: FAMILY MEDICINE

## 2022-11-30 PROCEDURE — G0103 PSA SCREENING: HCPCS | Performed by: FAMILY MEDICINE

## 2022-11-30 PROCEDURE — 80050 GENERAL HEALTH PANEL: CPT | Performed by: FAMILY MEDICINE

## 2022-11-30 ASSESSMENT — ENCOUNTER SYMPTOMS
CHILLS: 0
PALPITATIONS: 0
JOINT SWELLING: 0
FREQUENCY: 0
CONSTIPATION: 0
HEADACHES: 0
EYE PAIN: 0
HEARTBURN: 0
ABDOMINAL PAIN: 1
NERVOUS/ANXIOUS: 0
FEVER: 0
DIZZINESS: 0
NAUSEA: 0
COUGH: 0
WEAKNESS: 0
HEMATOCHEZIA: 0
PARESTHESIAS: 0
MYALGIAS: 0
HEMATURIA: 0
ARTHRALGIAS: 0
SORE THROAT: 0
DIARRHEA: 0
DYSURIA: 0
SHORTNESS OF BREATH: 0

## 2022-11-30 ASSESSMENT — PAIN SCALES - GENERAL: PAINLEVEL: NO PAIN (0)

## 2022-11-30 NOTE — PROGRESS NOTES
SUBJECTIVE:   CC: Luciano is an 62 year old who presents for preventative health visit.      Patient has been advised of split billing requirements and indicates understanding: Yes  Healthy Habits:     Getting at least 3 servings of Calcium per day:  Yes    Bi-annual eye exam:  Yes    Dental care twice a year:  Yes    Sleep apnea or symptoms of sleep apnea:  Daytime drowsiness    Diet:  Regular (no restrictions)    Frequency of exercise:  2-3 days/week    Duration of exercise:  15-30 minutes    Taking medications regularly:  Yes    Medication side effects:  Lightheadedness    PHQ-2 Total Score: 2    Additional concerns today:  Yes               Today's PHQ-2 Score:   PHQ-2 ( 1999 [employer]) 11/25/2022   Q1: Little interest or pleasure in doing things 1   Q2: Feeling down, depressed or hopeless 1   PHQ-2 Score 2   PHQ-2 Total Score (12-17 Years)- Positive if 3 or more points; Administer PHQ-A if positive -   Q1: Little interest or pleasure in doing things Several days   Q2: Feeling down, depressed or hopeless Several days   PHQ-2 Score 2           Social History     Tobacco Use     Smoking status: Never     Smokeless tobacco: Never   Substance Use Topics     Alcohol use: Yes     Comment: sometimes     If you drink alcohol do you typically have >3 drinks per day or >7 drinks per week? Yes      Alcohol Use 11/25/2022   Prescreen: >3 drinks/day or >7 drinks/week? Yes   Prescreen: >3 drinks/day or >7 drinks/week? -   AUDIT SCORE  -     AUDIT - Alcohol Use Disorders Identification Test - Reproduced from the World Health Organization Audit 2001 (Second Edition) 11/25/2022   1.  How often do you have a drink containing alcohol? 2 to 3 times a week   2.  How many drinks containing alcohol do you have on a typical day when you are drinking? 1 or 2   3.  How often do you have five or more drinks on one occasion? Never   4.  How often during the last year have you found that you were not able to stop drinking once you had started? -    5.  How often during the last year have you failed to do what was normally expected of you because of drinking? -   6.  How often during the last year have you needed a first drink in the morning to get yourself going after a heavy drinking session? -   7.  How often during the last year have you had a feeling of guilt or remorse after drinking? -   8.  How often during the last year have you been unable to remember what happened the night before because of your drinking? -   9.  Have you or someone else been injured because of your drinking? No   10. Has a relative, friend, doctor or other health care worker been concerned about your drinking or suggested you cut down? No   TOTAL SCORE -       Last PSA:   PSA   Date Value Ref Range Status   07/24/2020 7.05 (H) 0 - 4 ug/L Final     Comment:     Assay Method:  Chemiluminescence using Siemens Vista analyzer     Prostate Specific Antigen Screen   Date Value Ref Range Status   11/26/2021 8.58 (H) 0.00 - 4.00 ug/L Final       Reviewed orders with patient. Reviewed health maintenance and updated orders accordingly - Yes       Reviewed and updated as needed this visit by clinical staff   Tobacco  Allergies  Meds              Reviewed and updated as needed this visit by Provider                     Review of Systems   Constitutional: Negative for chills and fever.   HENT: Negative for congestion, ear pain, hearing loss and sore throat.    Eyes: Positive for visual disturbance. Negative for pain.   Respiratory: Negative for cough and shortness of breath.    Cardiovascular: Negative for chest pain, palpitations and peripheral edema.   Gastrointestinal: Positive for abdominal pain. Negative for constipation, diarrhea, heartburn, hematochezia and nausea.   Genitourinary: Positive for urgency. Negative for dysuria, frequency, genital sores, hematuria, impotence and penile discharge.   Musculoskeletal: Negative for arthralgias, joint swelling and myalgias.   Skin: Negative  "for rash.   Neurological: Negative for dizziness, weakness, headaches and paresthesias.   Psychiatric/Behavioral: Negative for mood changes. The patient is not nervous/anxious.       last week had some abd pain, gets  Plugged up  Drinking liquids helps    Doing  Well now    Sometimes urinary urgency    Right eye was getting worse, saw eye doctor    Got new prescription     But then right eye changed again and so now old glasses work better    The sildenafil does make patient lightheaded so not using    Kids healthy    Seated work    Sit/stand desk    Also uses yoga ball    Occasional walking         OBJECTIVE:   /82 (BP Location: Left arm, Patient Position: Chair, Cuff Size: Adult Regular)   Pulse 78   Temp 97.9  F (36.6  C) (Temporal)   Ht 1.882 m (6' 2.08\")   Wt 96.7 kg (213 lb 2 oz)   SpO2 100%   BMI 27.30 kg/m      Physical Exam  GENERAL: healthy, alert and no distress  EYES: Eyes grossly normal to inspection, PERRL and conjunctivae and sclerae normal  HENT: ear canals and TM's normal, nose and mouth without ulcers or lesions  NECK: no adenopathy, no asymmetry, masses, or scars and thyroid normal to palpation  RESP: lungs clear to auscultation - no rales, rhonchi or wheezes  CV: regular rate and rhythm, normal S1 S2, no S3 or S4, no murmur, click or rub, no peripheral edema and peripheral pulses strong  ABDOMEN: soft, nontender, without hepatosplenomegaly or masses, bowel sounds normal and umbilical hernia present, mildly tender   MS: no gross musculoskeletal defects noted, no edema  SKIN: no suspicious lesions or rashes  NEURO: Normal strength and tone, mentation intact and speech normal  PSYCH: mentation appears normal, affect normal/bright    Diagnostic Test Results:  Labs reviewed in Epic    ASSESSMENT/PLAN:   Dawson was seen today for physical.    Diagnoses and all orders for this visit:    Routine general medical examination at a health care facility    History of colonic polyps  -     Adult GI " " Referral - Procedure Only; Future    Impaired fasting glucose  -     Hemoglobin A1c; Future  -     Hemoglobin A1c    Fatigue, unspecified type  -     TSH with free T4 reflex; Future  -     CBC with Platelets & Differential; Future  -     TSH with free T4 reflex  -     CBC with Platelets & Differential    Hyperlipidemia LDL goal <100  -     Lipid panel reflex to direct LDL Non-fasting; Future  -     Comprehensive metabolic panel; Future  -     Lipid panel reflex to direct LDL Non-fasting  -     Comprehensive metabolic panel    Screening for prostate cancer  -     Prostate Specific Antigen Screen; Future  -     Prostate Specific Antigen Screen    Elevated prostate specific antigen (PSA)  -     Adult Urology  Referral; Future    Discussed several issues with patient  Keep working on healthy diet/exercise   psa likely to still be high; patient will schedule with urology   Check labs  Patient to schedule colonoscopy for mid 2023    Patient has been advised of split billing requirements and indicates understanding: Yes      COUNSELING:   Reviewed preventive health counseling, as reflected in patient instructions       Regular exercise       Healthy diet/nutrition       Vision screening       Safe sex practices/STD prevention       Colorectal cancer screening       Prostate cancer screening      BMI:   Estimated body mass index is 27.3 kg/m  as calculated from the following:    Height as of this encounter: 1.882 m (6' 2.08\").    Weight as of this encounter: 96.7 kg (213 lb 2 oz).   Weight management plan: Discussed healthy diet and exercise guidelines      He reports that he has never smoked. He has never used smokeless tobacco.         Kaiden Emmanuel MD  [facility]  "

## 2022-11-30 NOTE — PATIENT INSTRUCTIONS
Keep working on healthy diet/exercise     We will send you lab results    If psa still high, see urology    Schedule colonoscopy for mid 2023 at [facility]          Preventive Health Recommendations  Male Ages 50 - 64    Yearly exam:             See your health care provider every year in order to  o   Review health changes.   o   Discuss preventive care.    o   Review your medicines if your doctor has prescribed any.   Have a cholesterol test every 5 years, or more frequently if you are at risk for high cholesterol/heart disease.   Have a diabetes test (fasting glucose) every three years. If you are at risk for diabetes, you should have this test more often.   Have a colonoscopy at age 50, or have a yearly FIT test (stool test). These exams will check for colon cancer.    Talk with your health care provider about whether or not a prostate cancer screening test (PSA) is right for you.  You should be tested each year for STDs (sexually transmitted diseases), if you re at risk.     Shots: Get a flu shot each year. Get a tetanus shot every 10 years.     Nutrition:  Eat at least 5 servings of fruits and vegetables daily.   Eat whole-grain bread, whole-wheat pasta and brown rice instead of white grains and rice.   Get adequate Calcium and Vitamin D.     Lifestyle  Exercise for at least 150 minutes a week (30 minutes a day, 5 days a week). This will help you control your weight and prevent disease.   Limit alcohol to one drink per day.   No smoking.   Wear sunscreen to prevent skin cancer.   See your dentist every six months for an exam and cleaning.   See your eye doctor every 1 to 2 years.

## 2022-12-01 NOTE — RESULT ENCOUNTER NOTE
"The prostate blood test is still high.  Do schedule with urology as we discussed.    Cholesterol moderately elevated.  Hemoglobin a1c is barely into the \"prediabetes\" range.  Keep working on healthy diet/exercise.     Other labs are fine.    Kaiden Emmanuel MD  "

## 2023-01-24 ENCOUNTER — OFFICE VISIT (OUTPATIENT)
Dept: UROLOGY | Facility: CLINIC | Age: 63
End: 2023-01-24
Payer: COMMERCIAL

## 2023-01-24 VITALS — OXYGEN SATURATION: 99 % | SYSTOLIC BLOOD PRESSURE: 138 MMHG | DIASTOLIC BLOOD PRESSURE: 88 MMHG | HEART RATE: 84 BPM

## 2023-01-24 DIAGNOSIS — R97.20 ELEVATED PROSTATE SPECIFIC ANTIGEN (PSA): ICD-10-CM

## 2023-01-24 DIAGNOSIS — N40.1 BENIGN PROSTATIC HYPERPLASIA WITH LOWER URINARY TRACT SYMPTOMS, SYMPTOM DETAILS UNSPECIFIED: Primary | ICD-10-CM

## 2023-01-24 PROCEDURE — 51798 US URINE CAPACITY MEASURE: CPT | Performed by: UROLOGY

## 2023-01-24 PROCEDURE — 99204 OFFICE O/P NEW MOD 45 MIN: CPT | Mod: 25 | Performed by: UROLOGY

## 2023-01-24 RX ORDER — ALFUZOSIN HYDROCHLORIDE 10 MG/1
10 TABLET, EXTENDED RELEASE ORAL AT BEDTIME
Qty: 30 TABLET | Refills: 1 | Status: SHIPPED | OUTPATIENT
Start: 2023-01-24 | End: 2023-02-23 | Stop reason: SINTOL

## 2023-01-24 NOTE — PATIENT INSTRUCTIONS
Please call the [facility] radiology to schedule an MRI of the prostate. [phone].  Please call our office to schedule your follow up once you have the test scheduled, [phone].  Please contact your insurance company to make sure the MRI scan is covered under your insurance plan.

## 2023-01-24 NOTE — PROGRESS NOTES
S: Dawson Garcia is a pleasant  62 year old male who was requested to be seen by  Kaiden Emmanuel for a consult with regard to patient's urinary complaints with elevated psa.  Patient complains of Nocturia x 2, Urgency, Frequency and weak stream.  He has no history of elevated PSA.  Symptoms have been on going for several years(s).  Seems to be worsened over time.  His recent PSA was found to be   PSA   Date Value Ref Range Status   2020 7.05 (H) 0 - 4 ug/L Final     Comment:     Assay Method:  Chemiluminescence using Siemens Vista analyzer     Prostate Specific Antigen Screen   Date Value Ref Range Status   2022 8.46 (H) 0.00 - 4.00 ug/L Final   .  His AUA Symptom Score:  11.  His QOL score:  3-4.  He was evaluated for elevated psa with normal MRI in 2018.    Current Outpatient Medications   Medication Sig Dispense Refill     alfuzosin ER (UROXATRAL) 10 MG 24 hr tablet Take 1 tablet (10 mg) by mouth At Bedtime 30 tablet 1     cholecalciferol (VITAMIN D3) 25 mcg (1000 units) capsule        famotidine (PEPCID) 20 MG tablet        Multiple Vitamins-Minerals (CENTRUM SILVER 50+MEN) TABS        Omega-3 Fatty Acids (SUPER OMEGA-3 PO) Fish oil  1200       omeprazole (PRILOSEC OTC) 20 MG EC tablet Take 20 mg by mouth daily       sildenafil (REVATIO) 20 MG tablet TAKE 1 TABLET BY MOUTH 1 HOUR BEFORE INTERCOURSE 30 tablet 0     Allergies   Allergen Reactions     Polytrim [Polymyxin B-Trimethoprim] Itching     Past Medical History:   Diagnosis Date     Alopecia areata      Backache, unspecified      Esophageal reflux     EGD 's     Hiatal hernia      Past Surgical History:   Procedure Laterality Date     CL AFF SURGICAL PATHOLOGY       HC TOOTH EXTRACTION W/FORCEP       REMOVAL OF SPERM DUCT(S)       ZZC APPENDECTOMY        Family History   Problem Relation Age of Onset     Heart Failure Mother      Diabetes Father         diet controlled     Heart Disease Father          84     C.A.D. Paternal  Grandfather      Heart Disease Paternal Grandfather         MI      Hypertension No family hx of      Cerebrovascular Disease No family hx of      Breast Cancer No family hx of      Cancer - colorectal No family hx of      Prostate Cancer No family hx of      Melanoma No family hx of      Skin Cancer No family hx of      Glaucoma No family hx of      Macular Degeneration No family hx of      He does not have a family history of prostate cancer.  Social History     Socioeconomic History     Marital status:      Spouse name: Crescencio     Number of children: 2     Years of education: None     Highest education level: None   Occupational History     Occupation:      Comment:    Tobacco Use     Smoking status: Never     Smokeless tobacco: Never   Vaping Use     Vaping Use: Never used   Substance and Sexual Activity     Alcohol use: Yes     Comment: sometimes     Drug use: No     Sexual activity: Yes     Partners: Female     Birth control/protection: Surgical     Comment: vasectomy   Other Topics Concern     Parent/sibling w/ CABG, MI or angioplasty before 65F 55M? No   Social History Narrative    Dairy/d 4-5 servings/d.     Caffeine 2 cups of tea     Exercise 1 x week    Sunscreen used - No    Seatbelts used - Yes    Working smoke/CO detectors in the home - Yes    Guns stored in the home - No    Self Breast Exams - NOT APPLICABLE    Self Testicular Exam - No    Eye Exam up to date - Yes    Dental Exam up to date - Yes    Pap Smear up to date - NOT APPLICABLE    Mammogram up to date - NOT APPLICABLE    PSA up to date - Yes    Dexa Scan up to date - NOT APPLICABLE    Flex Sig / Colonoscopy up to date - Yes    Immunizations up to date - Yes    Abuse: Current or Past(Physical, Sexual or Emotional)- No    Do you feel safe in your environment - Yes        10/2009                REVIEW OF SYSTEMS  =================  C: NEGATIVE for fever, chills, change in weight  I: NEGATIVE for  worrisome rashes, moles or lesions  E/M: NEGATIVE for ear, mouth and throat problems  R: NEGATIVE for significant cough or SHORTNESS OF BREATH  CV:  NEGATIVE for chest pain, palpitations or peripheral edema  GI: NEGATIVE for nausea, abdominal pain, heartburn, or change in bowel habits  NEURO: NEGATIVE numbness/weakness  : see HPI  PSYCH: NEGATIVE depression/anxiety  LYmph: no new enlarged lymph nodes  Ortho: no new trauma/movements           O: Exam:/88 (BP Location: Right arm, Patient Position: Chair, Cuff Size: Adult Large)   Pulse 84   SpO2 99%    Constitutional: healthy, alert and no distress  Cardiovascular: negative, PMI normal.   Respiratory: negative, no evidence of respiratory distress  Gastrointestinal: Abdomen soft, non-tender. BS normal. No masses, organomegaly  : penis no discharge.  Testis no masses.  No scrotal skin lesion.  Prostate large smooth.  Musculoskeletal: extremities normal- no gross deformities noted, gait normal and normal muscle tone  Skin: no suspicious lesions or rashes  Neurologic: Alert and oriented  Psychiatric: mentation appears normal. and affect normal/bright  Hematologic/Lymphatic/Immunologic: normal ant/post cervical, axillary, supraclavicular and inguinal nodes    Assessment/Plan:   (N40.1) Benign prostatic hyperplasia with lower urinary tract symptoms, symptom details unspecified  (primary encounter diagnosis)  Comment: PVR < 50 ml  Plan: trial of alfuzosin           Side effects discussed            Interaction with viagra discussed    (R97.20) Elevated prostate specific antigen (PSA)  Comment:    Plan: MR Prostate wo & w Contrast           Next.  Discussed prostate biopsy.  MRI not sensitive enough to r/o cancer however.

## 2023-02-16 ENCOUNTER — ANCILLARY PROCEDURE (OUTPATIENT)
Dept: MRI IMAGING | Facility: CLINIC | Age: 63
End: 2023-02-16
Attending: UROLOGY
Payer: COMMERCIAL

## 2023-02-16 DIAGNOSIS — N40.1 BENIGN PROSTATIC HYPERPLASIA WITH LOWER URINARY TRACT SYMPTOMS, SYMPTOM DETAILS UNSPECIFIED: ICD-10-CM

## 2023-02-16 DIAGNOSIS — R97.20 ELEVATED PROSTATE SPECIFIC ANTIGEN (PSA): ICD-10-CM

## 2023-02-16 PROCEDURE — A9585 GADOBUTROL INJECTION: HCPCS | Performed by: RADIOLOGY

## 2023-02-16 PROCEDURE — 72197 MRI PELVIS W/O & W/DYE: CPT | Mod: GC | Performed by: RADIOLOGY

## 2023-02-16 RX ORDER — GADOBUTROL 604.72 MG/ML
10 INJECTION INTRAVENOUS ONCE
Status: COMPLETED | OUTPATIENT
Start: 2023-02-16 | End: 2023-02-16

## 2023-02-16 RX ADMIN — GADOBUTROL 10 ML: 604.72 INJECTION INTRAVENOUS at 13:04

## 2023-02-23 ENCOUNTER — VIRTUAL VISIT (OUTPATIENT)
Dept: UROLOGY | Facility: CLINIC | Age: 63
End: 2023-02-23
Payer: COMMERCIAL

## 2023-02-23 DIAGNOSIS — N40.1 BENIGN PROSTATIC HYPERPLASIA WITH LOWER URINARY TRACT SYMPTOMS, SYMPTOM DETAILS UNSPECIFIED: ICD-10-CM

## 2023-02-23 DIAGNOSIS — R97.20 ELEVATED PROSTATE SPECIFIC ANTIGEN (PSA): Primary | ICD-10-CM

## 2023-02-23 PROCEDURE — 99214 OFFICE O/P EST MOD 30 MIN: CPT | Mod: VID | Performed by: UROLOGY

## 2023-02-23 RX ORDER — LEVOFLOXACIN 500 MG/1
500 TABLET, FILM COATED ORAL DAILY
Qty: 3 TABLET | Refills: 0 | Status: SHIPPED | OUTPATIENT
Start: 2023-02-23 | End: 2023-02-26

## 2023-02-23 NOTE — PATIENT INSTRUCTIONS
Prostate Ultrasound Instructions  Dr. Espinosa  [address]  [place] [address]  [phone]        Take antibiotics as directed on label. START THE MORNING BEFORE THE BIOPSY   1 Fleets enema to be done 1    - 2 hours before OR THE EVENING BEFORE THE procedure   NOTHING BY MOUTH AFTER THE ENEMA OR 2 HOURS BEFORE THE PROCEDURE.  If you are taking blood thinners (Aspirin, ibuprofen, Aleve) this is to be stopped ONE WEEK before the procedure. Tylenol is ok. If you are taking Coumadin, you must check with your primary doctor for further instructions  Please follow up in the office with the doctor ONE WEEK after the procedure to go over results      PROSTATE BIOPSY  Patient information:  You have had an examination of the prostate gland called a YOMI (digital rectal examination) where a finger was inserted into your rectum to enable the doctor to feel your prostate gland. You may also have had a PSA (prostate specific antigen) blood test.  Sometimes an elevated PSA level and an abnormal YOMI can be signs of prostate cancer. The doctor has recommended that you have a prostate biopsy.  What is a prostate biopsy:  You will be positioned for the biopsy as preferred by the physician.  An ultrasound probe is inserted into the rectum and a needle is then passed through the wall of the rectum and an injection of local anesthetic is given. Following the injection of local anesthetic, a needle will be inserted into the prostate gland to take a sample of cells. The doctor will usually take 12 separate biopsies.  You can hear some clicking sounds from the biopsy instrument whilst the biopsies are taken.  The procedure will take approximately five to ten minutes.   What are the risks?  Infection: As there is a chance of infection we give you antibiotics before the procedure to reduce the risk. However is you experience a fever, chills, nausea, or just not feeling well up to 72 hours after the biopsy you need to go directly to the  emergency room.   Antibiotics: The physician will send a prescription to your preferred pharmacy. You will follow the directions on the bottle. Start the antibiotics on THE MORNING BEFORE THE BIOPSY.   Bleeding: Some men will see blood in the urine, semen and stool.  Bleeding can last for 6-8 weeks intermittently.  If you take any medication such as aspirin, warfarin, clopidogrel then the risk of bleeding will be higher.   Retention: Some men are unable to pass urine after the biopsy. This is called urine retention. This is very rare.  If you are unable to urinate please call our clinic or go to urgent care or the emergency room.   Getting the results:  The biopsy samples will be sent to a laboratory for examination by a pathologist.  It can take up to one week, sometimes longer, for your doctor to receive the results.  You will be given an appointment to return to clinic for the results of your biopsy within 1-2 weeks. If you do not receive an appointment for the results of the biopsy at the time of the scheduling, please contact the clinic to arrange an appointment for the results.   WE ARE NOT IN ANY CIRCUMSTANCES PERMITTED TO GIVE RESULTS OVER THE PHONE    What to expect following a biopsy:  You are advised to take it easy for the remainder of the day.  You may eat and drink as normal.  No restrictions to your normal daily routine.  As there is a risk of infection you will be given a course of antibiotics.  PLEASE COMPLETE THE FULL COURSE AS INSTRUCTED   Contact information:  Please call the clinic with any further questions [phone]   *Do not leave an urgent message on this voicemail as we may not receive it until the following business day*

## 2023-02-23 NOTE — PROGRESS NOTES
Pt is NOT taking Alfuzosin, see below for the reason.        Rosanna PHILLIPS RN Urology 2/23/2023 7:34 AM

## 2023-02-23 NOTE — PROGRESS NOTES
Luciano is a 62 year old who is being evaluated via a billable video visit.      How would you like to obtain your AVS? MyChart  If the video visit is dropped, the invitation should be resent by: Text to cell phone: [phone]  Will anyone else be joining your video visit? No          Assessment & Plan   Problem List Items Addressed This Visit    None  Visit Diagnoses     Elevated prostate specific antigen (PSA)    -  Primary    Benign prostatic hyperplasia with lower urinary tract symptoms, symptom details unspecified               Review of the result(s) of each unique test - prostate MRI  30 minutes spent on the date of the encounter doing chart review, history and exam, documentation and further activities per the note           No follow-ups on file.    Brayan Espinosa MD  [facility]   Luciano is a 62 year old, presenting for the following health issues:  Video Visit      HPI     62 y.o. male with benign prostatic hyperplasia/elevated psa.  Recent prostate MRI showed enlarged prostate with PIRADS 2 nodule only.  His psa was 8's.  He has mild/mod LUTS.  He tried uroxatral recently but has to stop due to side effects.      Review of Systems   Constitutional, HEENT, cardiovascular, pulmonary, gi and gu systems are negative, except as otherwise noted.      Objective           Vitals:  No vitals were obtained today due to virtual visit.    Physical Exam   GENERAL: Healthy, alert and no distress  EYES: Eyes grossly normal to inspection.  No discharge or erythema, or obvious scleral/conjunctival abnormalities.  RESP: No audible wheeze, cough, or visible cyanosis.  No visible retractions or increased work of breathing.    SKIN: Visible skin clear. No significant rash, abnormal pigmentation or lesions.  NEURO: Cranial nerves grossly intact.  Mentation and speech appropriate for age.  PSYCH: Mentation appears normal, affect normal/bright, judgement and insight intact, normal speech  and appearance well-groomed.    MRI PROSTATE: 2/16/2023 1:14 PM     CLINICAL HISTORY: Elevated prostate specific antigen (PSA); Benign  prostatic hyperplasia with lower urinary tract symptoms, symptom  details unspecified     Most Recent PSA: 8.46 ug/L (11/30/2022)     Comparison: MRI 1/16/2018.     TECHNIQUE:  The following sequences were obtained: High-resolution axial  T2-weighted, coronal T2-weighted, 3D volumetric T2-weighted, axial  pre-contrast T1, axial diffusion-weighted, axial apparent diffusion  coefficient and axial dynamic contrast-enhanced T1. Postcontrast  images were evaluated on a separate workstation to evaluate dynamic  contrast enhancement. The technique of this exam is PI-RADS v2.1  compliant. Contrast dose: 10.0mL Gadavist     FINDINGS:  Size:  5.0 x 5.8 x 6.1  92.0 grams, previously 58 g.  Hemorrhage: Absent  Peripheral zone: Heterogeneous on T2-weighted images. No suspicious  lesions identified.  Transition zone: Enlarged with BPH changes. Transition zone nodules  which are circumscribed or mostly encapsulated without diffusion  restriction.  PI-RADS 2.  No highly suspicious nodules.     Neurovascular bundles: No neurovascular bundle involvement by  malignancy.  Seminal vesicles: No seminal vesicle involvement by malignancy.  Lymph nodes: No lymph node involvement  Bones: No suspicious lesions  Other pelvic organs: Irregular trabeculated bladder wall likely  secondary to bladder obstruction from prostatic hypertrophy. No  additional findings.                                                                         IMPRESSION:  1. Based on the most suspicious abnormality, this exam is  characterized as PIRADS 2 - Clinically significant cancer is unlikely  to be present.    2. No suspicious adenopathy or evidence of pelvic metastases.  3. Interval increase in prostatomegaly.        PIRADS? v2.1 Assessment Categories   PIRADS 1: Very low (clinically significant cancer is highly unlikely  to be  present)   PIRADS 2: Low (clinically significant cancer is unlikely to be  present)   PIRADS 3: Intermediate (the presence of clinically significant cancer  is equivocal)   PIRADS 4: High (clinically significant cancer is likely to be present)     PIRADS 5: Very high (clinically significant cancer is highly likely to  be present)     I have personally reviewed the examination and initial interpretation  and I agree with the findings.     AMINTA ALFARO MD     1 elevated psa:  Normal prostate MRI.  Discussed pros and cons of prostate biopsy.  Risk of infection discussed.  Schedule for prostate biopsy next.    2 BPH:  Stop uroxatral.  Discussed proscar/avodart.  Patient is doing better and does not want treatment now.            Video-Visit Details    Type of service:  Video Visit     Originating Location (pt. Location): Home    Distant Location (provider location):  Off-site  Platform used for Video Visit: Olga

## 2023-03-27 ENCOUNTER — MYC MEDICAL ADVICE (OUTPATIENT)
Dept: FAMILY MEDICINE | Facility: CLINIC | Age: 63
End: 2023-03-27
Payer: COMMERCIAL

## 2023-03-27 DIAGNOSIS — U07.1 INFECTION DUE TO 2019 NOVEL CORONAVIRUS: Primary | ICD-10-CM

## 2023-03-28 NOTE — TELEPHONE ENCOUNTER
Called patient. Razorsight message sent to patient.    RN COVID TREATMENT VISIT  03/28/23      The patient has been triaged and does not require a higher level of care.    Dawson Garcia  62 year old  Current weight? 220 lb.    Has the patient been seen by a primary care provider at an [facility] or [facility] within the past two years? Yes.   Have you been in close proximity to/do you have a known exposure to a person with a confirmed case of influenza? No.     General treatment eligibility:  Date of positive COVID test (PCR or at home)?  03/27/2023    Are you or have you been hospitalized for this COVID-19 infection? No.   Have you received monoclonal antibodies or antiviral treatment for COVID-19 since this positive test? No.   Do you have any of the following conditions that place you at risk of being very sick from COVID-19?   - Age 50 years or older  Yes, patient has at least one high risk condition as noted above.     Current COVID symptoms:   - cough  - fatigue  - muscle or body aches  - headache  - sore throat  - congestion or runny nose  Yes. Patient has at least one symptom as selected.   Symptoms began on 03/26/2023    How many days since symptoms started? 5 days or less. Established patient, 12 years or older weighing at least 88.2 lbs, who has symptoms that started in the past 5 days, has not been hospitalized nor received treatment already, and is at risk for being very sick from COVID-19.     Treatment eligibility by RN:    Are you currently pregnant or nursing? No    Do you have a clinically significant hypersensitivity to nirmatrelvir or ritonavir, or toxic epidermal necrolysis (TEN) or Craig-Carter Syndrome? No    Do you have a history of hepatitis, any hepatic impairment on the Problem List (such as Child-Yo Class C, cirrhosis, fatty liver disease, alcoholic liver disease), or was the last liver lab (hepatic panel, ALT, AST, ALK Phos, bilirubin) elevated in the past  6 months? No    Do you have any history of severe renal impairment (eGFR < 30mL/min)? No    Is patient eligible to continue?   Yes, patient meets all eligibility requirements for the RN COVID treatment (as denoted by all no responses above).     Current Outpatient Medications   Medication Sig Dispense Refill     cholecalciferol (VITAMIN D3) 25 mcg (1000 units) capsule        famotidine (PEPCID) 20 MG tablet        Multiple Vitamins-Minerals (CENTRUM SILVER 50+MEN) TABS        Omega-3 Fatty Acids (SUPER OMEGA-3 PO) Fish oil  1200       omeprazole (PRILOSEC OTC) 20 MG EC tablet Take 20 mg by mouth daily       sildenafil (REVATIO) 20 MG tablet TAKE 1 TABLET BY MOUTH 1 HOUR BEFORE INTERCOURSE 30 tablet 0   Sildenafil: occasionally  And takes OTC medicaitons  Medications from List 1 of the standing order (on medications that exclude the use of Paxlovid) that patient is taking: NONE. Is patient taking [place]'s Wort? No  Is patient taking [place]'s Wort or any meds from List 1? No.   Medications from List 2 of the standing order (on meds that provider needs to adjust) that patient is taking: NONE. Is patient on any of the meds from List 2? No.   Medications from List 3 of standing order (on meds that a RN needs to adjust) that patient is taking: NONE. Is patient on any meds from List 3? No.     Paxlovid has an approximate 90% reduction in hospitalization. Paxlovid can possibly cause altered sense of taste, diarrhea (loose, watery stools), high blood pressure, muscle aches.     Would patient like a Paxlovid prescription?   Yes.   Lab Results   Component Value Date    GFRESTIMATED >90 11/30/2022       Was last eGFR reduced? No, eGFR 60 or greater/ No Result on record. Patient can receive the normal renal function dose. Paxlovid Rx sent to [facility]     Temporary change to home medications: None    All medication adjustments (holds, etc) were discussed with the patient and patient was asked to repeat back  (teachback) their med adjustment.  Did patient understand med adjustment? No medication adjustments needed.         Reviewed the following instructions with the patient:    Paxlovid (nimatrelvir and ritonavir)    How it works  Two medicines (nirmatrelvir and ritonavir) are taken together. They stop the virus from growing. Less amount of virus is easier for your body to fight.    How to take    Medicine comes in a daily container with both medicine tablets. Take by mouth twice daily (once in the morning, once at night) for 5 days.    The number of tablets to take varies by patient.    Don't chew or break capsules. Swallow whole.    When to take  Take as soon as possible after positive COVID-19 test result, and within 5 days of your first symptoms.    Possible side effects  Can cause altered sense of taste, diarrhea (loose, watery stools), high blood pressure, muscle aches.    Radha Enrique RN

## 2023-05-26 ENCOUNTER — NURSE TRIAGE (OUTPATIENT)
Dept: NURSING | Facility: CLINIC | Age: 63
End: 2023-05-26
Payer: COMMERCIAL

## 2023-05-26 NOTE — TELEPHONE ENCOUNTER
"Onset sore throat x 4 days. Thought it was due to smoke in air. Has been worsening. No difficulty breathing. Hurts to talk also. No rash on body. Has red patches on back of throat. Pain level is \"6-7\"  Temp 97.7 by mouth.     Protocol reviewed, care advice given. Disposition to be seen in 24 hours, Call back with worsening symptoms, further questions/concerns.     LINDEN WATKINS RN  [facility] nurse advisors  5/26/2023  7:00 PM    Reason for Disposition    Earache also present    Additional Information    Negative: SEVERE difficulty breathing (e.g., struggling for each breath, speaks in single words, stridor)    Negative: Sounds like a life-threatening emergency to the triager    Negative: [1] Drooling or spitting out saliva (because can't swallow) AND [2] normal breathing    Negative: Unable to open mouth completely    Negative: [1] Difficulty breathing AND [2] not severe    Negative: Fever > 104 F (40 C)    Negative: [1] Refuses to drink anything AND [2] for > 12 hours    Negative: [1] Drinking very little AND [2] dehydration suspected (e.g., no urine > 12 hours, very dry mouth, very lightheaded)    Negative: Patient sounds very sick or weak to the triager    Negative: SEVERE (e.g., excruciating) throat pain    Negative: [1] Pus on tonsils (back of throat) AND [2]  fever AND [3] swollen neck lymph nodes (\"glands\")    Negative: [1] Rash AND [2] widespread (especially chest and abdomen)    Protocols used: SORE THROAT-A-AH      "

## 2023-05-27 ENCOUNTER — OFFICE VISIT (OUTPATIENT)
Dept: URGENT CARE | Facility: URGENT CARE | Age: 63
End: 2023-05-27
Payer: COMMERCIAL

## 2023-05-27 VITALS
BODY MASS INDEX: 26.35 KG/M2 | DIASTOLIC BLOOD PRESSURE: 79 MMHG | RESPIRATION RATE: 20 BRPM | WEIGHT: 205.7 LBS | HEART RATE: 80 BPM | OXYGEN SATURATION: 99 % | TEMPERATURE: 97.4 F | SYSTOLIC BLOOD PRESSURE: 121 MMHG

## 2023-05-27 DIAGNOSIS — R07.0 THROAT PAIN: ICD-10-CM

## 2023-05-27 DIAGNOSIS — R22.1 NECK SWELLING: Primary | ICD-10-CM

## 2023-05-27 LAB
DEPRECATED S PYO AG THROAT QL EIA: NEGATIVE
GROUP A STREP BY PCR: NOT DETECTED

## 2023-05-27 PROCEDURE — 87651 STREP A DNA AMP PROBE: CPT | Performed by: STUDENT IN AN ORGANIZED HEALTH CARE EDUCATION/TRAINING PROGRAM

## 2023-05-27 PROCEDURE — 99213 OFFICE O/P EST LOW 20 MIN: CPT | Performed by: STUDENT IN AN ORGANIZED HEALTH CARE EDUCATION/TRAINING PROGRAM

## 2023-05-27 NOTE — PATIENT INSTRUCTIONS
Treat for bacterial throat infection, possible peritonsillar infection which is an infection deeper in the throat that cannot be tested for with a throat swab. The antibiotic I prescribed to treat the infection is Augmentin twice daily for 7 days.    Monitor the throat swelling and pain. If the swelling is worsening or you develop fevers/chills, go to the ER to do imaging of the throat.    If the pain improves but the swelling is still present, return to urgent care and they can refer you to the [facility] ([facility]) to get imaging of your thyroid/neck OR you can see your primary doctor.    Mckenna Carter, CNP

## 2023-05-27 NOTE — PROGRESS NOTES
Assessment & Plan     Neck swelling  Rapid strep negative. The pain is unilateral and some mild swelling on right side on exam. Will treat with broad spectrum augmentin and advised if swelling, pain worsening or if develops fevers to go to ER to rule out peritonsillar abscess or other cause.   - amoxicillin-clavulanate (AUGMENTIN) 875-125 MG tablet  Dispense: 14 tablet; Refill: 0    Throat pain  - Streptococcus A Rapid Screen w/Reflex to PCR - Clinic Collect  - Group A Streptococcus PCR Throat Swab  - amoxicillin-clavulanate (AUGMENTIN) 875-125 MG tablet  Dispense: 14 tablet; Refill: 0         No follow-ups on file.    Maida Carter, APRN CNP  [facility]    Abida Wolf is a 62 year old male who presents to clinic today for the following health issues:  Chief Complaint   Patient presents with     Pharyngitis     X5 days     HPI       Covid over [place].  Sore throat a few times after off and on. No fevers. No history of thyroid illness.       Review of Systems  Constitutional, HEENT, cardiovascular, pulmonary, gi and gu systems are negative, except as otherwise noted.      Objective    /79   Pulse 80   Temp 97.4  F (36.3  C) (Tympanic)   Resp 20   Wt 93.3 kg (205 lb 11.2 oz)   SpO2 99%   BMI 26.35 kg/m    Physical Exam   GENERAL: healthy, alert and no distress  EYES: Eyes grossly normal to inspection, PERRL and conjunctivae and sclerae normal  HENT: ear canals and TM's normal, nose and mouth without ulcers or lesions  NECK: mild asymmteric swelling of the left neck adjacent to sellers apple without clear adenopathy, mild tenderness to palpation in this area  MS: no gross musculoskeletal defects noted, no edema  SKIN: no suspicious lesions or rashes  NEURO: Normal strength and tone, mentation intact and speech normal    Results for orders placed or performed in visit on 05/27/23   Streptococcus A Rapid Screen w/Reflex to PCR - Clinic Collect     Status: Normal     Specimen: Throat; Swab   Result Value Ref Range    Group A Strep antigen Negative Negative

## 2023-06-08 ENCOUNTER — OFFICE VISIT (OUTPATIENT)
Dept: FAMILY MEDICINE | Facility: CLINIC | Age: 63
End: 2023-06-08
Payer: COMMERCIAL

## 2023-06-08 VITALS
OXYGEN SATURATION: 98 % | BODY MASS INDEX: 26.63 KG/M2 | SYSTOLIC BLOOD PRESSURE: 119 MMHG | TEMPERATURE: 98.1 F | WEIGHT: 207.5 LBS | HEIGHT: 74 IN | HEART RATE: 76 BPM | DIASTOLIC BLOOD PRESSURE: 75 MMHG | RESPIRATION RATE: 16 BRPM

## 2023-06-08 DIAGNOSIS — R68.89 THROAT CONGESTION: ICD-10-CM

## 2023-06-08 DIAGNOSIS — H69.90 DYSFUNCTION OF EUSTACHIAN TUBE, UNSPECIFIED LATERALITY: Primary | ICD-10-CM

## 2023-06-08 PROCEDURE — 99213 OFFICE O/P EST LOW 20 MIN: CPT | Performed by: FAMILY MEDICINE

## 2023-06-08 ASSESSMENT — PAIN SCALES - GENERAL: PAINLEVEL: NO PAIN (1)

## 2023-06-08 NOTE — PROGRESS NOTES
"       Abida Wolf is a 63 year old, presenting for the following health issues:  Throat Pain        6/8/2023     7:25 AM   Additional Questions   Roomed by Francesca Coe     History of Present Illness       Reason for visit:  Sore throat  Symptom onset:  3-4 weeks ago    He eats 2-3 servings of fruits and vegetables daily.He consumes 0 sweetened beverage(s) daily.He exercises with enough effort to increase his heart rate 9 or less minutes per day.  He exercises with enough effort to increase his heart rate 3 or less days per week.   He is taking medications regularly.          Review of Systems       Reviewed urgent care visit    Much better now but still with some symptoms     At  left side was more swollen    Now  With irritation right side     Feels like lump with swallowing    Able to swallow easily    No pain with swallowing now    In spring some allergy type symptoms     Eyes seem dry, not itching    Very rare sneezing/ runny nose          Objective    /75 (BP Location: Left arm, Patient Position: Chair, Cuff Size: Adult Regular)   Pulse 76   Temp 98.1  F (36.7  C) (Temporal)   Resp 16   Ht 1.882 m (6' 2.08\")   Wt 94.1 kg (207 lb 8 oz)   SpO2 98%   BMI 26.58 kg/m    Body mass index is 26.58 kg/m .  Physical Exam  Constitutional:       Appearance: He is well-developed.   HENT:      Head: Normocephalic and atraumatic.      Right Ear: Tympanic membrane, ear canal and external ear normal.      Left Ear: Tympanic membrane, ear canal and external ear normal.      Nose: Nose normal.      Mouth/Throat:      Mouth: Mucous membranes are moist.      Pharynx: Oropharynx is clear.   Eyes:      Conjunctiva/sclera: Conjunctivae normal.   Neck:      Vascular: No carotid bruit.   Cardiovascular:      Rate and Rhythm: Normal rate and regular rhythm.      Heart sounds: Normal heart sounds.   Pulmonary:      Effort: Pulmonary effort is normal. No respiratory distress.      Breath sounds: Normal breath " sounds.   Neurological:      Mental Status: He is alert and oriented to person, place, and time.      Cranial Nerves: No cranial nerve deficit.   Psychiatric:         Speech: Speech normal.         Behavior: Behavior normal.           ASSESSMENT / PLAN:  (H69.80) Dysfunction of Eustachian tube, unspecified laterality  (primary encounter diagnosis)  Comment: overall exam good and reassuring.    Plan: discussed in detail.     (R68.89) Throat congestion  Comment: could use occasional decongestant  Plan: as above      See AVS      I reviewed the patient's medications, allergies, medical history, family history, and social history.    Kaiden Emmanuel MD

## 2023-06-08 NOTE — PATIENT INSTRUCTIONS
Could use occasional decongestant ( sudafed or pseudoephedrine ) as needed    Stay well hydrated    Monitor symptoms    Call if not resolving  in 2-3 weeks    Be seen promptly if symptoms acutely worsen

## 2023-07-23 ENCOUNTER — MYC MEDICAL ADVICE (OUTPATIENT)
Dept: FAMILY MEDICINE | Facility: CLINIC | Age: 63
End: 2023-07-23
Payer: COMMERCIAL

## 2023-08-18 ENCOUNTER — TRANSFERRED RECORDS (OUTPATIENT)
Dept: HEALTH INFORMATION MANAGEMENT | Facility: CLINIC | Age: 63
End: 2023-08-18
Payer: COMMERCIAL

## 2023-08-19 ENCOUNTER — OFFICE VISIT (OUTPATIENT)
Dept: URGENT CARE | Facility: URGENT CARE | Age: 63
End: 2023-08-19
Payer: COMMERCIAL

## 2023-08-19 VITALS
RESPIRATION RATE: 16 BRPM | DIASTOLIC BLOOD PRESSURE: 83 MMHG | TEMPERATURE: 97.6 F | HEART RATE: 91 BPM | OXYGEN SATURATION: 98 % | SYSTOLIC BLOOD PRESSURE: 131 MMHG | BODY MASS INDEX: 25.76 KG/M2 | WEIGHT: 201.1 LBS

## 2023-08-19 DIAGNOSIS — S39.012A BACK STRAIN, INITIAL ENCOUNTER: Primary | ICD-10-CM

## 2023-08-19 PROCEDURE — 99213 OFFICE O/P EST LOW 20 MIN: CPT | Performed by: EMERGENCY MEDICINE

## 2023-08-19 RX ORDER — CYCLOBENZAPRINE HCL 10 MG
10 TABLET ORAL 2 TIMES DAILY PRN
Qty: 20 TABLET | Refills: 0 | Status: SHIPPED | OUTPATIENT
Start: 2023-08-19 | End: 2023-12-01

## 2023-08-19 ASSESSMENT — PAIN SCALES - GENERAL: PAINLEVEL: NO PAIN (1)

## 2023-08-19 NOTE — PROGRESS NOTES
"Assessment & Plan     Diagnosis:    ICD-10-CM    1. Back strain, initial encounter  S39.012A cyclobenzaprine (FLEXERIL) 10 MG tablet          Medical Decision Making:  Dawson Garcia is a 63 year old male who presents for evaluation of back pain that worsened acutely after bending down this morning.  He has a history of back pain in the past. The patient did not sustain any trauma, therefore x-rays are not necessary due to the low likelihood of fracture or subluxation.      No red flag symptoms to suggest ER evaluation with CT and/or MRI is indicated at this point.  There is no clinical evidence or history concerning for cauda equina syndrome, discitis, spinal/epidural space hematoma or epidural abscess or other worrisome etiology. The neurological exam is normal and the patient's symptoms seem consistent with musculoskeletal issues and significant muscle spasm.    The patient will be discharged with pain medications to use as directed. Ice or heat to the back and stretching exercises. No heavy lifting, bending or twisting. Go to the ER if increasing pain, numbness, weakness, or bowel or bladder dysfunction. Patient was advised to schedule follow-up with their primary doctor within 3 days to re-assess symptoms. Questions answered.    Juan Alberto Juárez PA-C  [facility]    Subjective     Dawson Garcia is a 63 year old male who presents to clinic today for the following health issues:  Chief Complaint   Patient presents with    Back Pain     Lower right back pain. Patient states that lower back has been \"acting up\" for past month but worsened this morning while bending over. Pain is worst during transitioning from seated to standing.        HPI  Patient present with back pain that started after bending down to pick something off the ground this morning.  He notes that he has a history of back pain has been acting up over the past month, but this seems to be more acute.  Pain primarily with " "transitioning from sitting to standing.  Pain is described as \"tight\" and is in the lower back; more on the right side; does not radiate all the way down the legs. Patient has been walking; notes pain is worse with bending and better with standing. No numbness or weakness, bowel or bladder incontinence, recent trauma, fever, night sweats, abdominal pain, IVDU or other concerns. Patient has no history of spine/back surgery.      Review of Systems    See HPI    Objective      Vitals: /83 (BP Location: Left arm, Patient Position: Sitting, Cuff Size: Adult Regular)   Pulse 91   Temp 97.6  F (36.4  C) (Tympanic)   Resp 16   Wt 91.2 kg (201 lb 1.6 oz)   SpO2 98%   BMI 25.76 kg/m        Patient Vitals for the past 24 hrs:   BP Temp Temp src Pulse Resp SpO2 Weight   08/19/23 1051 131/83 97.6  F (36.4  C) Tympanic 91 16 98 % 91.2 kg (201 lb 1.6 oz)       Vital signs reviewed by: Juan Alberto Juárez PA-C    Physical Exam   Constitutional: Alert and active. Mild acute distress.  Cardiovascular: Regular rate and rhythm  Pulmonary/Chest: Effort normal. No respiratory distress. Lungs are clear to auscultation throughout.  GI: Abdomen is soft and non-tender throughout. No CVA tenderness bilaterally.  Musculoskeletal: Tenderness to palpation in the right lumbar paraspinal musculature. No midline T or L spine tenderness to palpation. Normal range of motion of the lower extremties.  Neurological: Alert and oriented x3. Strength and sensation is intact and symmetric in the bilateral lower extermities.  Skin: No rash noted on visualized skin on back.       Juan Alberto Juárez PA-C, August 19, 2023                        "

## 2023-09-10 ENCOUNTER — OFFICE VISIT (OUTPATIENT)
Dept: URGENT CARE | Facility: URGENT CARE | Age: 63
End: 2023-09-10
Payer: COMMERCIAL

## 2023-09-10 VITALS
RESPIRATION RATE: 14 BRPM | WEIGHT: 202 LBS | TEMPERATURE: 97.7 F | HEART RATE: 84 BPM | OXYGEN SATURATION: 97 % | BODY MASS INDEX: 25.88 KG/M2 | SYSTOLIC BLOOD PRESSURE: 129 MMHG | DIASTOLIC BLOOD PRESSURE: 82 MMHG

## 2023-09-10 DIAGNOSIS — N40.0 PROSTATE ENLARGEMENT: Primary | ICD-10-CM

## 2023-09-10 DIAGNOSIS — R30.0 DYSURIA: ICD-10-CM

## 2023-09-10 LAB
ALBUMIN UR-MCNC: NEGATIVE MG/DL
ANION GAP SERPL CALCULATED.3IONS-SCNC: 11 MMOL/L (ref 7–15)
APPEARANCE UR: CLEAR
BILIRUB UR QL STRIP: NEGATIVE
BUN SERPL-MCNC: 11.7 MG/DL (ref 8–23)
CALCIUM SERPL-MCNC: 9.8 MG/DL (ref 8.8–10.2)
CHLORIDE SERPL-SCNC: 102 MMOL/L (ref 98–107)
COLOR UR AUTO: YELLOW
CREAT SERPL-MCNC: 0.92 MG/DL (ref 0.67–1.17)
DEPRECATED HCO3 PLAS-SCNC: 27 MMOL/L (ref 22–29)
EGFRCR SERPLBLD CKD-EPI 2021: >90 ML/MIN/1.73M2
ERYTHROCYTE [DISTWIDTH] IN BLOOD BY AUTOMATED COUNT: 12.9 % (ref 10–15)
GLUCOSE SERPL-MCNC: 117 MG/DL (ref 70–99)
GLUCOSE UR STRIP-MCNC: NEGATIVE MG/DL
HCT VFR BLD AUTO: 46.6 % (ref 40–53)
HGB BLD-MCNC: 15.3 G/DL (ref 13.3–17.7)
HGB UR QL STRIP: NEGATIVE
KETONES UR STRIP-MCNC: NEGATIVE MG/DL
LEUKOCYTE ESTERASE UR QL STRIP: NEGATIVE
MCH RBC QN AUTO: 29.8 PG (ref 26.5–33)
MCHC RBC AUTO-ENTMCNC: 32.8 G/DL (ref 31.5–36.5)
MCV RBC AUTO: 91 FL (ref 78–100)
NITRATE UR QL: NEGATIVE
PH UR STRIP: 7 [PH] (ref 5–7)
PLATELET # BLD AUTO: 153 10E3/UL (ref 150–450)
POTASSIUM SERPL-SCNC: 5.1 MMOL/L (ref 3.4–5.3)
PSA FREE MFR SERPL: 17.63 %
PSA FREE SERPL-MCNC: 1.7 NG/ML
PSA SERPL DL<=0.01 NG/ML-MCNC: 9.64 NG/ML (ref 0–4.5)
RBC # BLD AUTO: 5.14 10E6/UL (ref 4.4–5.9)
SODIUM SERPL-SCNC: 140 MMOL/L (ref 136–145)
SP GR UR STRIP: 1.01 (ref 1–1.03)
UROBILINOGEN UR STRIP-ACNC: 0.2 E.U./DL
WBC # BLD AUTO: 4.7 10E3/UL (ref 4–11)

## 2023-09-10 PROCEDURE — 81003 URINALYSIS AUTO W/O SCOPE: CPT | Performed by: STUDENT IN AN ORGANIZED HEALTH CARE EDUCATION/TRAINING PROGRAM

## 2023-09-10 PROCEDURE — 99214 OFFICE O/P EST MOD 30 MIN: CPT | Performed by: STUDENT IN AN ORGANIZED HEALTH CARE EDUCATION/TRAINING PROGRAM

## 2023-09-10 PROCEDURE — 80048 BASIC METABOLIC PNL TOTAL CA: CPT | Performed by: STUDENT IN AN ORGANIZED HEALTH CARE EDUCATION/TRAINING PROGRAM

## 2023-09-10 PROCEDURE — 84153 ASSAY OF PSA TOTAL: CPT | Performed by: STUDENT IN AN ORGANIZED HEALTH CARE EDUCATION/TRAINING PROGRAM

## 2023-09-10 PROCEDURE — 36415 COLL VENOUS BLD VENIPUNCTURE: CPT | Performed by: STUDENT IN AN ORGANIZED HEALTH CARE EDUCATION/TRAINING PROGRAM

## 2023-09-10 PROCEDURE — 85027 COMPLETE CBC AUTOMATED: CPT | Performed by: STUDENT IN AN ORGANIZED HEALTH CARE EDUCATION/TRAINING PROGRAM

## 2023-09-10 PROCEDURE — 84154 ASSAY OF PSA FREE: CPT | Performed by: STUDENT IN AN ORGANIZED HEALTH CARE EDUCATION/TRAINING PROGRAM

## 2023-09-10 NOTE — PROGRESS NOTES
Assessment & Plan     Prostate enlargement  Patient presents to urgent care with symptoms of incomplete emptying of bladder, urgency, frequency and mild pain with urination.  The symptoms started a couple days after his recent colonoscopy 3 weeks ago.  He also reports having generalized low back discomfort for the past 2 months and was seen in urgent care and treated with Flexeril but the back pain has persisted.  It is not limiting his normal daily activities but is wondering if there is any correlation between his current symptoms and the pain.  He has had no fever, chills, flank pain, changes in bowel habits.  He does have a history of benign prostatic hypertrophy and had an MRI of his prostate last year that showed no suspicious lesions or masses.  I did a rectal exam and he does not have tenderness of the prostate on exam to clearly suggest prostatitis.  UA is completely normal.  WBC count is normal. I recommended checking PSA which can also help us understand if this is prostatitis and will get blood test result back tomorrow.  I also did a kidney function test which will come back tomorrow.  Addendum 9/11/23: BMP is unremarkable other than mild elevation in glucose which was done non-fasting. Kidney function normal. PSA is slightly higher than year prior but within normal expected limits for his age so unlikely pointing to prostatitis particularly with nontender prostate. I suspect his prostate was irritated from recent colonoscopy causing low-grade inflammation and increasing pressure on urinary tract. Advised starting Flomax and seeing urology within the next couple weeks to further evaluate. Discussed use and poss side effects of Flomax and not to take at the same time as Viagra.    - PSA, total and free  - Basic metabolic panel  (Ca, Cl, CO2, Creat, Gluc, K, Na, BUN)  - CBC with platelets  - PSA, total and free  - Basic metabolic panel  (Ca, Cl, CO2, Creat, Gluc, K, Na, BUN)  - CBC with platelets  -  tamsulosin (FLOMAX) 0.4 MG capsule; Take 1 capsule (0.4 mg) by mouth daily  Dispense: 30 capsule; Refill: 1  - Urology referral     Dysuria  - UA Macroscopic with reflex to Microscopic and Culture - Clinic Collect  - PSA, total and free  - Basic metabolic panel  (Ca, Cl, CO2, Creat, Gluc, K, Na, BUN)  - CBC with platelets  - PSA, total and free  - Basic metabolic panel  (Ca, Cl, CO2, Creat, Gluc, K, Na, BUN)  - CBC with platelets     30 minutes spent by me on the date of the encounter doing chart review, review of test results, interpretation of tests, patient visit, and documentation         No follow-ups on file.    Maida Carter, APRJONNY [facility]    Abida Wolf is a 63 year old male who presents to clinic today for the following health issues:  Chief Complaint   Patient presents with    UTI     HPI    (ciprofloxacin 500 mg orally every 12 hours or levofloxacin 500 mg orally once daily) or trimethoprim-sulfamethoxazole (one double-strength tab orally every 12 hours) as empiric therapy.     We treat with antibiotics for up to six week     Review of Systems        Objective    /82   Pulse 84   Temp 97.7  F (36.5  C)   Resp 14   Wt 91.6 kg (202 lb)   SpO2 97%   BMI 25.88 kg/m    Physical Exam   GENERAL: healthy, alert and no distress   (male): enlarged prostate, no tenderness to palpation of prostate with digital rectal exam  MS: no gross musculoskeletal defects noted, no edema  SKIN: no suspicious lesions or rashes  NEURO: Normal strength and tone, mentation intact and speech normal  BACK: no CVA tenderness, no paralumbar tenderness  PSYCH: mentation appears normal, affect normal/bright    Results for orders placed or performed in visit on 09/10/23 (from the past 24 hour(s))   UA Macroscopic with reflex to Microscopic and Culture - Clinic Collect    Specimen: Urine, Midstream   Result Value Ref Range    Color Urine Yellow Colorless, Straw, Light  Yellow, Yellow    Appearance Urine Clear Clear    Glucose Urine Negative Negative mg/dL    Bilirubin Urine Negative Negative    Ketones Urine Negative Negative mg/dL    Specific Gravity Urine 1.015 1.003 - 1.035    Blood Urine Negative Negative    pH Urine 7.0 5.0 - 7.0    Protein Albumin Urine Negative Negative mg/dL    Urobilinogen Urine 0.2 0.2, 1.0 E.U./dL    Nitrite Urine Negative Negative    Leukocyte Esterase Urine Negative Negative    Narrative    Microscopic not indicated   CBC with platelets   Result Value Ref Range    WBC Count 4.7 4.0 - 11.0 10e3/uL    RBC Count 5.14 4.40 - 5.90 10e6/uL    Hemoglobin 15.3 13.3 - 17.7 g/dL    Hematocrit 46.6 40.0 - 53.0 %    MCV 91 78 - 100 fL    MCH 29.8 26.5 - 33.0 pg    MCHC 32.8 31.5 - 36.5 g/dL    RDW 12.9 10.0 - 15.0 %    Platelet Count 153 150 - 450 10e3/uL

## 2023-09-10 NOTE — PATIENT INSTRUCTIONS
Call me tomorrow at the [facility] - [phone]   Mckenna Caretr, CNP

## 2023-09-11 ENCOUNTER — TELEPHONE (OUTPATIENT)
Dept: UROLOGY | Facility: CLINIC | Age: 63
End: 2023-09-11
Payer: COMMERCIAL

## 2023-09-11 DIAGNOSIS — R35.0 BENIGN PROSTATIC HYPERPLASIA WITH URINARY FREQUENCY: ICD-10-CM

## 2023-09-11 DIAGNOSIS — R30.0 DYSURIA: Primary | ICD-10-CM

## 2023-09-11 DIAGNOSIS — N40.1 BENIGN PROSTATIC HYPERPLASIA WITH URINARY FREQUENCY: ICD-10-CM

## 2023-09-11 RX ORDER — TAMSULOSIN HYDROCHLORIDE 0.4 MG/1
0.4 CAPSULE ORAL DAILY
Qty: 30 CAPSULE | Refills: 1 | Status: SHIPPED | OUTPATIENT
Start: 2023-09-11 | End: 2023-12-01

## 2023-09-11 NOTE — TELEPHONE ENCOUNTER
[facility] Call Center    Phone Message    May a detailed message be left on voicemail: yes     Reason for Call: Other: Patient being referred for Urgent Appointment (1-2 weeks) for Dysuria, Benign prostatic hyperplasia with urinary frequency. Patient is a return Pt of Dr. Espinosa. I scheduled the first available appointment on 10/3 at 1:15 pm. Sending encounter for review and follow-up with Patient if sooner appointment is needed.      NOTE: Patient declined video and stated he is very concerned about waiting until October to be seen. He stated he would like to see another provider if Dr. Espinosa is not able to get him in within the timeframe requested by referring provider.     Action Taken: Other: [facility]    Travel Screening: Not Applicable

## 2023-09-11 NOTE — TELEPHONE ENCOUNTER
Writer called and spoke with pt. Appt scheduled for next week. Pt declined tomorrow appt.    Rosanna PHILLIPS RN Urology 9/11/2023 3:23 PM

## 2023-11-09 ENCOUNTER — IMMUNIZATION (OUTPATIENT)
Dept: NURSING | Facility: CLINIC | Age: 63
End: 2023-11-09
Payer: COMMERCIAL

## 2023-11-09 PROCEDURE — 91320 SARSCV2 VAC 30MCG TRS-SUC IM: CPT

## 2023-11-09 PROCEDURE — 90662 IIV NO PRSV INCREASED AG IM: CPT

## 2023-11-09 PROCEDURE — 90471 IMMUNIZATION ADMIN: CPT

## 2023-11-09 PROCEDURE — 90480 ADMN SARSCOV2 VAC 1/ONLY CMP: CPT

## 2023-11-24 ASSESSMENT — ENCOUNTER SYMPTOMS
CONSTIPATION: 0
ARTHRALGIAS: 0
NAUSEA: 0
FEVER: 0
HEMATURIA: 0
JOINT SWELLING: 0
EYE PAIN: 0
FREQUENCY: 0
HEARTBURN: 0
DYSURIA: 0
PARESTHESIAS: 0
CHILLS: 0
SORE THROAT: 0
MYALGIAS: 0
HEADACHES: 0
DIARRHEA: 0
PALPITATIONS: 0
WEAKNESS: 0
SHORTNESS OF BREATH: 0
DIZZINESS: 0
ABDOMINAL PAIN: 0
COUGH: 0
HEMATOCHEZIA: 0
NERVOUS/ANXIOUS: 0

## 2023-11-24 ASSESSMENT — ACTIVITIES OF DAILY LIVING (ADL): CURRENT_FUNCTION: NO ASSISTANCE NEEDED

## 2023-12-01 ENCOUNTER — OFFICE VISIT (OUTPATIENT)
Dept: FAMILY MEDICINE | Facility: CLINIC | Age: 63
End: 2023-12-01
Payer: COMMERCIAL

## 2023-12-01 VITALS
OXYGEN SATURATION: 100 % | RESPIRATION RATE: 18 BRPM | WEIGHT: 205.5 LBS | BODY MASS INDEX: 26.37 KG/M2 | SYSTOLIC BLOOD PRESSURE: 122 MMHG | DIASTOLIC BLOOD PRESSURE: 80 MMHG | HEART RATE: 83 BPM | TEMPERATURE: 98.3 F | HEIGHT: 74 IN

## 2023-12-01 DIAGNOSIS — Z12.5 SCREENING FOR PROSTATE CANCER: ICD-10-CM

## 2023-12-01 DIAGNOSIS — R73.01 IMPAIRED FASTING GLUCOSE: ICD-10-CM

## 2023-12-01 DIAGNOSIS — E78.5 HYPERLIPIDEMIA LDL GOAL <100: ICD-10-CM

## 2023-12-01 DIAGNOSIS — N52.9 ERECTILE DYSFUNCTION, UNSPECIFIED ERECTILE DYSFUNCTION TYPE: ICD-10-CM

## 2023-12-01 DIAGNOSIS — Z00.00 ENCOUNTER FOR MEDICARE ANNUAL WELLNESS EXAM: Primary | ICD-10-CM

## 2023-12-01 LAB
CHOLEST SERPL-MCNC: 209 MG/DL
HBA1C MFR BLD: 5.6 % (ref 0–5.6)
HDLC SERPL-MCNC: 54 MG/DL
LDLC SERPL CALC-MCNC: 124 MG/DL
NONHDLC SERPL-MCNC: 155 MG/DL
PSA SERPL DL<=0.01 NG/ML-MCNC: 9.13 NG/ML (ref 0–4.5)
TRIGL SERPL-MCNC: 153 MG/DL

## 2023-12-01 PROCEDURE — 80061 LIPID PANEL: CPT | Performed by: FAMILY MEDICINE

## 2023-12-01 PROCEDURE — 83036 HEMOGLOBIN GLYCOSYLATED A1C: CPT | Performed by: FAMILY MEDICINE

## 2023-12-01 PROCEDURE — G0103 PSA SCREENING: HCPCS | Performed by: FAMILY MEDICINE

## 2023-12-01 PROCEDURE — 99397 PER PM REEVAL EST PAT 65+ YR: CPT | Performed by: FAMILY MEDICINE

## 2023-12-01 PROCEDURE — 36415 COLL VENOUS BLD VENIPUNCTURE: CPT | Performed by: FAMILY MEDICINE

## 2023-12-01 RX ORDER — SILDENAFIL CITRATE 20 MG/1
TABLET ORAL
Qty: 30 TABLET | Refills: 0 | Status: SHIPPED | OUTPATIENT
Start: 2023-12-01

## 2023-12-01 ASSESSMENT — PAIN SCALES - GENERAL: PAINLEVEL: NO PAIN (0)

## 2023-12-01 ASSESSMENT — ENCOUNTER SYMPTOMS
WEAKNESS: 0
DIARRHEA: 0
FEVER: 0
HEARTBURN: 0
ARTHRALGIAS: 0
NERVOUS/ANXIOUS: 0
HEMATURIA: 0
SHORTNESS OF BREATH: 0
DYSURIA: 0
MYALGIAS: 0
JOINT SWELLING: 0
PARESTHESIAS: 0
HEADACHES: 0
CHILLS: 0
HEMATOCHEZIA: 0
PALPITATIONS: 0
CONSTIPATION: 0
ABDOMINAL PAIN: 0
SORE THROAT: 0
DIZZINESS: 0
COUGH: 0
NAUSEA: 0
FREQUENCY: 0
EYE PAIN: 0

## 2023-12-01 ASSESSMENT — ACTIVITIES OF DAILY LIVING (ADL): CURRENT_FUNCTION: NO ASSISTANCE NEEDED

## 2023-12-01 NOTE — PROGRESS NOTES
"SUBJECTIVE:   Luciano is a 63 year old, presenting for the following:  Wellness Visit (Fasting)        12/1/2023     8:20 AM   Additional Questions   Roomed by Francesca Coe       Are you in the first 12 months of your Medicare coverage?  No    Healthy Habits:     In general, how would you rate your overall health?  Fair    Frequency of exercise:  1 day/week    Duration of exercise:  Less than 15 minutes    Do you usually eat at least 4 servings of fruit and vegetables a day, include whole grains    & fiber and avoid regularly eating high fat or \"junk\" foods?  No    Taking medications regularly:  Yes    Barriers to taking medications:  None    Medication side effects:  None    Ability to successfully perform activities of daily living:  No assistance needed    Home Safety:  No safety concerns identified    Hearing Impairment:  Difficulty following a conversation in a noisy restaurant or crowded room and difficulty understanding soft or whispered speech    In the past 6 months, have you been bothered by leaking of urine? Yes    In general, how would you rate your overall mental or emotional health?  Fair    Additional concerns today:  Yes      Today's PHQ-2 Score:       11/30/2023     4:08 PM   PHQ-2 ( 1999 [employer])   Q1: Little interest or pleasure in doing things 0   Q2: Feeling down, depressed or hopeless 0   PHQ-2 Score 0   Q1: Little interest or pleasure in doing things Not at all   Q2: Feeling down, depressed or hopeless Not at all   PHQ-2 Score 0           Have you ever done Advance Care Planning? (For example, a Health Directive, POLST, or a discussion with a medical provider or your loved ones about your wishes): Yes, advance care planning is on file.       Fall risk  Fallen 2 or more times in the past year?: No  Any fall with injury in the past year?: No    Cognitive Screening   1) Repeat 3 items (Leader, Season, Table)     2) Clock draw:  NORMAL  3) 3 item recall:  Recalls 3 objects  Results: 3 items " recalled: COGNITIVE IMPAIRMENT LESS LIKELY    Mini-CogTM Copyright ALAN León. Licensed by the author for use in [facility]; reprinted with permission (sodejah@[email or web address]). All rights reserved.      Do you have sleep apnea, excessive snoring or daytime drowsiness? : no    Reviewed and updated as needed this visit by clinical staff   Tobacco  Allergies  Meds              Reviewed and updated as needed this visit by Provider                 Social History     Tobacco Use    Smoking status: Never     Passive exposure: Never    Smokeless tobacco: Never   Substance Use Topics    Alcohol use: Yes     Comment: sometimes             11/24/2023    11:19 AM   Alcohol Use   Prescreen: >3 drinks/day or >7 drinks/week? No         11/25/2022    10:38 AM   AUDIT - Alcohol Use Disorders Identification Test - Reproduced from the World Health Organization Audit 2001 (Second Edition)   1.  How often do you have a drink containing alcohol? 2 to 3 times a week   2.  How many drinks containing alcohol do you have on a typical day when you are drinking? 1 or 2   3.  How often do you have five or more drinks on one occasion? Never   9.  Have you or someone else been injured because of your drinking? No   10. Has a relative, friend, doctor or other health care worker been concerned about your drinking or suggested you cut down? No     Do you have a current opioid prescription? No  Do you use any other controlled substances or medications that are not prescribed by a provider? None                Current providers sharing in care for this patient include:    Patient Care Team:  Kaiden Emmanuel MD as PCP - General (Family Medicine)  Susan Maddox MD as MD (Urology)  Kaiden Emmanuel MD as Assigned PCP  Brayan Espinosa MD as MD (Urology)  Brayan Espinosa MD as Assigned Surgical Provider  Maida Carter APRN CNP as Nurse Practitioner (Family Medicine)    The following health maintenance items are  reviewed in Epic and correct as of today:  Health Maintenance   Topic Date Due    ZOSTER IMMUNIZATION (1 of 2) Never done    RSV VACCINE (Pregnancy & 60+) (1 - 1-dose 60+ series) Never done    ANNUAL REVIEW OF HM ORDERS  11/17/2022    AORTIC ANEURYSM SCREENING (SYSTEM ASSIGNED)  Never done    Pneumococcal Vaccine: 65+ Years (1 - PCV) 06/08/2023    MEDICARE ANNUAL WELLNESS VISIT  11/30/2023    FALL RISK ASSESSMENT  12/01/2024    LIPID  11/30/2027    ADVANCE CARE PLANNING  12/01/2028    COLORECTAL CANCER SCREENING  08/18/2030    DTAP/TDAP/TD IMMUNIZATION (3 - Td or Tdap) 11/17/2031    HEPATITIS C SCREENING  Completed    HIV SCREENING  Completed    PHQ-2 (once per calendar year)  Completed    INFLUENZA VACCINE  Completed    COVID-19 Vaccine  Completed    IPV IMMUNIZATION  Aged Out    HPV IMMUNIZATION  Aged Out    MENINGITIS IMMUNIZATION  Aged Out    RSV MONOCLONAL ANTIBODY  Aged Out                 Review of Systems   Constitutional:  Negative for chills and fever.   HENT:  Negative for congestion, ear pain, hearing loss and sore throat.    Eyes:  Negative for pain and visual disturbance.   Respiratory:  Negative for cough and shortness of breath.    Cardiovascular:  Negative for chest pain, palpitations and peripheral edema.   Gastrointestinal:  Negative for abdominal pain, constipation, diarrhea, heartburn, hematochezia and nausea.   Genitourinary:  Positive for impotence and urgency. Negative for dysuria, frequency, genital sores, hematuria and penile discharge.   Musculoskeletal:  Negative for arthralgias, joint swelling and myalgias.   Skin:  Negative for rash.   Neurological:  Negative for dizziness, weakness, headaches and paresthesias.   Psychiatric/Behavioral:  Negative for mood changes. The patient is not nervous/anxious.      Back improving    A little stretching in morning    No regular exercise    Occasional long walk    Cyclobenz did not help much    Pain stays in back    Not going down legs    Kids  "healthy    Retiring soon        OBJECTIVE:   BP (!) 145/89 (BP Location: Right arm, Patient Position: Chair, Cuff Size: Adult Regular)   Pulse 83   Temp 98.3  F (36.8  C) (Temporal)   Resp 18   Ht 1.882 m (6' 2.08\")   Wt 93.2 kg (205 lb 8 oz)   SpO2 100%   BMI 26.33 kg/m   Estimated body mass index is 26.33 kg/m  as calculated from the following:    Height as of this encounter: 1.882 m (6' 2.08\").    Weight as of this encounter: 93.2 kg (205 lb 8 oz).  Physical Exam  GENERAL: healthy, alert and no distress  EYES: Eyes grossly normal to inspection, PERRL and conjunctivae and sclerae normal  HENT: ear canals and TM's normal, nose and mouth without ulcers or lesions  NECK: no adenopathy, no asymmetry, masses, or scars and thyroid normal to palpation  RESP: lungs clear to auscultation - no rales, rhonchi or wheezes  CV: regular rate and rhythm, normal S1 S2, no S3 or S4, no murmur, click or rub, no peripheral edema and peripheral pulses strong  ABDOMEN: soft, nontender, no hepatosplenomegaly, no masses and bowel sounds normal  MS: no gross musculoskeletal defects noted, no edema  SKIN: no suspicious lesions or rashes  NEURO: Normal strength and tone, mentation intact and speech normal  PSYCH: mentation appears normal, affect normal/bright  Range of motion of back fair.  Sensation and strength are normal in both lower extremities.  Negative straight leg raising test bilaterally.  Able get up on heels and toes normally.  No pain on axial loading      Diagnostic Test Results:  Labs reviewed in Epic    ASSESSMENT / PLAN:   Luciano was seen today for wellness visit.    Diagnoses and all orders for this visit:    Encounter for Medicare annual wellness exam    Erectile dysfunction, unspecified erectile dysfunction type  -     sildenafil (REVATIO) 20 MG tablet; TAKE 1 TABLET BY MOUTH 1 HOUR BEFORE INTERCOURSE    Screening for prostate cancer  -     Prostate Specific Antigen Screen; Future  -     Prostate Specific Antigen " "Screen    Impaired fasting glucose  -     Hemoglobin A1c; Future  -     Hemoglobin A1c    Hyperlipidemia LDL goal <100  -     Lipid panel reflex to direct LDL Fasting; Future  -     Lipid panel reflex to direct LDL Fasting    Other orders  -     PRIMARY CARE FOLLOW-UP SCHEDULING; Future    Overall patient in stable overall health  Urged an active stretching/ strengthening approach to the back   Keep working on healthy diet/exercise   Check labs   Up to date colonoscopy   Refill med, used occasionally     Patient has been advised of split billing requirements and indicates understanding: Yes      COUNSELING:  Reviewed preventive health counseling, as reflected in patient instructions       Regular exercise       Healthy diet/nutrition       Vision screening       Dental care       Safe sex practices/STD prevention       Colon cancer screening       Prostate cancer screening      BMI:   Estimated body mass index is 26.33 kg/m  as calculated from the following:    Height as of this encounter: 1.882 m (6' 2.08\").    Weight as of this encounter: 93.2 kg (205 lb 8 oz).   Weight management plan: Discussed healthy diet and exercise guidelines      He reports that he has never smoked. He has never been exposed to tobacco smoke. He has never used smokeless tobacco.      Appropriate preventive services were discussed with this patient, including applicable screening as appropriate for fall prevention, nutrition, physical activity, Tobacco-use cessation, weight loss and cognition.  Checklist reviewing preventive services available has been given to the patient.    Reviewed patients plan of care and provided an AVS. The Basic Care Plan (routine screening as documented in Health Maintenance) for Dawson meets the Care Plan requirement. This Care Plan has been established and reviewed with the Patient.          Kaiden Emmanuel MD  [facility]    Identified Health Risks:  I have reviewed Opioid Use Disorder " and Substance Use Disorder risk factors and made any needed referrals.   The patient was provided with suggestions to help him develop a healthy physical lifestyle.  He is at risk for lack of exercise and has been provided with information to increase physical activity for the benefit of his well-being.  The patient was counseled and encouraged to consider modifying their diet and eating habits. He was provided with information on recommended healthy diet options.  The patient was provided with written information regarding signs of hearing loss.  Information on urinary incontinence and treatment options given to patient.  The patient was provided with suggestions to help him develop a healthy emotional lifestyle.

## 2023-12-01 NOTE — PATIENT INSTRUCTIONS
Work on stretching/ strengthening for the back    Increase exercise overall    We will send you lab results                Patient Education   Personalized Prevention Plan  You are due for the preventive services outlined below.  Your care team is available to assist you in scheduling these services.  If you have already completed any of these items, please share that information with your care team to update in your medical record.  Health Maintenance Due   Topic Date Due    Zoster (Shingles) Vaccine (1 of 2) Never done    RSV VACCINE (Pregnancy & 60+) (1 - 1-dose 60+ series) Never done    ANNUAL REVIEW OF HM ORDERS  11/17/2022    AORTIC ANEURYSM SCREENING (SYSTEM ASSIGNED)  Never done    Pneumococcal Vaccine (1 - PCV) 06/08/2023    Annual Wellness Visit  11/30/2023     Your Health Risk Assessment indicates you feel you are not in good health    A healthy lifestyle helps keep the body fit and the mind alert. It helps protect you from disease, helps you fight disease, and helps prevent chronic disease (disease that doesn't go away) from getting worse. This is important as you get older and begin to notice twinges in muscles and joints and a decline in the strength and stamina you once took for granted. A healthy lifestyle includes good healthcare, good nutrition, weight control, recreation, and regular exercise. Avoid harmful substances and do what you can to keep safe. Another part of a healthy lifestyle is stay mentally active and socially involved.    Good healthcare   Have a wellness visit every year.   If you have new symptoms, let us know right away. Don't wait until the next checkup.   Take medicines exactly as prescribed and keep your medicines in a safe place. Tell us if your medicine causes problems.   Healthy diet and weight control   Eat 3 or 4 small, nutritious, low-fat, high-fiber meals a day. Include a variety of fruits, vegetables, and whole-grain foods.   Make sure you get enough calcium in your  "diet. Calcium, vitamin D, and exercise help prevent osteoporosis (bone thinning).   If you live alone, try eating with others when you can. That way you get a good meal and have company while you eat it.   Try to keep a healthy weight. If you eat more calories than your body uses for energy, it will be stored as fat and you will gain weight.     Recreation   Recreation is not limited to sports and team events. It includes any activity that provides relaxation, interest, enjoyment, and exercise. Recreation provides an outlet for physical, mental, and social energy. It can give a sense of worth and achievement. It can help you stay healthy.    Mental Exercise and Social Involvement  Mental and emotional health is as important as physical health. Keep in touch with friends and family. Stay as active as possible. Continue to learn and challenge yourself.   Things you can do to stay mentally active are:  Learn something new, like a foreign language or musical instrument.   Play SCRABBLE or do crossword puzzles. If you cannot find people to play these games with you at home, you can play them with others on your computer through the Internet.   Join a games club--anything from card games to chess or checkers or lawn bowling.   Start a new hobby.   Go back to school.   Volunteer.   Read.   Keep up with world events.  Learning About Being Physically Active  What is physical activity?     Being physically active means doing any kind of activity that gets your body moving.  The types of physical activity that can help you get fit and stay healthy include:  Aerobic or \"cardio\" activities. These make your heart beat faster and make you breathe harder, such as brisk walking, riding a bike, or running. They strengthen your heart and lungs and build up your endurance.  Strength training activities. These make your muscles work against, or \"resist,\" something. Examples include lifting weights or doing push-ups. These activities " help tone and strengthen your muscles and bones.  Stretches. These let you move your joints and muscles through their full range of motion. Stretching helps you be more flexible.  Reaching a balance between these three types of physical activity is important because each one contributes to your overall fitness.  What are the benefits of being active?  Being active is one of the best things you can do for your health. It helps you to:  Feel stronger and have more energy to do all the things you like to do.  Focus better at school or work.  Feel, think, and sleep better.  Reach and stay at a healthy weight.  Lose fat and build lean muscle.  Lower your risk for serious health problems, including diabetes, heart attack, high blood pressure, and some cancers.  Keep your heart, lungs, bones, muscles, and joints strong and healthy.  How can you make being active part of your life?  Start slowly. Make it your long-term goal to get at least 30 minutes of exercise on most days of the week. Walking is a good choice. You also may want to do other activities, such as running, swimming, cycling, or playing tennis or team sports.  Pick activities that you like--ones that make your heart beat faster, your muscles stronger, and your muscles and joints more flexible. If you find more than one thing you like doing, do them all. You don't have to do the same thing every day.  Get your heart pumping every day. Any activity that makes your heart beat faster and keeps it at that rate for a while counts.  Here are some great ways to get your heart beating faster:  Go for a brisk walk, run, or hike.  Go for a swim or bike ride.  Take an online exercise class or dance.  Play a game of touch football, basketball, or soccer.  Play tennis, pickleball, or racquetball.  Climb stairs.  Even some household chores can be aerobic. Just do them at a faster pace. Raking or mowing the lawn, sweeping the garage, and vacuuming and cleaning your home all  "can help get your heart rate up.  Strengthen your muscles during the week. You don't have to lift heavy weights or grow big, bulky muscles to get stronger. Doing a few simple activities that make your muscles work against, or \"resist,\" something can help you get stronger. Aim for at least twice a week.  For example, you can:  Do push-ups or sit-ups, which use your own body weight as resistance.  Lift weights or dumbbells or use stretch bands at home or in a gym or community center.  Stretch your muscles often. Stretching will help you as you become more active. It can help you stay flexible and loosen tight muscles. It can also help improve your balance and posture and can be a great way to relax.  Be sure to stretch the muscles you'll be using when you work out. It's best to warm your muscles slightly before you stretch them. Walk or do some other light aerobic activity for a few minutes. Then start stretching.  When you stretch your muscles:  Do it slowly. Stretching is not about going fast or making sudden movements.  Don't push or bounce during a stretch.  Hold each stretch for at least 15 to 30 seconds, if you can. You should feel a stretch in the muscle, but not pain.  Breathe out as you do the stretch. Then breathe in as you hold the stretch. Don't hold your breath.  If you're worried about how more activity might affect your health, have a checkup before you start. Follow any special advice your doctor gives you for getting a smart start.  Where can you learn more?  Go to https://www.[employer].net/patiented  Enter W332 in the search box to learn more about \"Learning About Being Physically Active.\"  Current as of: June 6, 2023               Content Version: 13.8    0185-8690 [employer].   Care instructions adapted under license by your healthcare professional. If you have questions about a medical condition or this instruction, always ask your healthcare professional. [employer] " disclaims any warranty or liability for your use of this information.      Learning About Dietary Guidelines  What are the Dietary Guidelines for Americans?     Dietary Guidelines for Americans provide tips for eating well and staying healthy. This helps reduce the risk for long-term (chronic) diseases.  These guidelines recommend that you:  Eat and drink the right amount for you. The [place] government's food guide is called MyPlate. It can help you make your own well-balanced eating plan.  Try to balance your eating with your activity. This helps you stay at a healthy weight.  Drink alcohol in moderation, if at all.  Limit foods high in salt, saturated fat, trans fat, and added sugar.  These guidelines are from the [place] Department of Agriculture and the [place] Department of Health and Human Services. They are updated every 5 years.  What is MyPlate?  MyPlate is the [place] government's food guide. It can help you make your own well-balanced eating plan. A balanced eating plan means that you eat enough, but not too much, and that your food gives you the nutrients you need to stay healthy.  MyPlate focuses on eating plenty of whole grains, fruits, and vegetables, and on limiting fat and sugar. It is available online at www.ChooseMyPlate.gov.  How can you get started?  If you're trying to eat healthier, you can slowly change your eating habits over time. You don't have to make big changes all at once. Start by adding one or two healthy foods to your meals each day.  Grains  Choose whole-grain breads and cereals and whole-wheat pasta and whole-grain crackers.  Vegetables  Eat a variety of vegetables every day. They have lots of nutrients and are part of a heart-healthy diet.  Fruits  Eat a variety of fruits every day. Fruits contain lots of nutrients. Choose fresh fruit instead of fruit juice.  Protein foods  Choose fish and lean poultry more often. Eat red meat and fried meats less often. Dried beans, tofu, and nuts are also  "good sources of protein.  Dairy  Choose low-fat or fat-free products from this food group. If you have problems digesting milk, try eating cheese or yogurt instead.  Fats and oils  Limit fats and oils if you're trying to cut calories. Choose healthy fats when you cook. These include canola oil and olive oil.  Where can you learn more?  Go to https://www.[employer]M.net/patiented  Enter D676 in the search box to learn more about \"Learning About Dietary Guidelines.\"  Current as of: February 28, 2023               Content Version: 13.8    3996-1285 [employer].   Care instructions adapted under license by your healthcare professional. If you have questions about a medical condition or this instruction, always ask your healthcare professional. [employer] disclaims any warranty or liability for your use of this information.      Hearing Loss: Care Instructions  Overview     Hearing loss is a sudden or slow decrease in how well you hear. It can range from slight to profound. Permanent hearing loss can occur with aging. It also can happen when you are exposed long-term to loud noise. Examples include listening to loud music, riding motorcycles, or being around other loud machines.  Hearing loss can affect your work and home life. It can make you feel lonely or depressed. You may feel that you have lost your independence. But hearing aids and other devices can help you hear better and feel connected to others.  Follow-up care is a key part of your treatment and safety. Be sure to make and go to all appointments, and call your doctor if you are having problems. It's also a good idea to know your test results and keep a list of the medicines you take.  How can you care for yourself at home?  Avoid loud noises whenever possible. This helps keep your hearing from getting worse.  Always wear hearing protection around loud noises.  Wear a hearing aid as directed.  A professional can help you pick a " "hearing aid that will work best for you.  You can also get hearing aids over the counter for mild to moderate hearing loss.  Have hearing tests as your doctor suggests. They can show whether your hearing has changed. Your hearing aid may need to be adjusted.  Use other devices as needed. These may include:  Telephone amplifiers and hearing aids that can connect to a television, stereo, radio, or microphone.  Devices that use lights or vibrations. These alert you to the doorbell, a ringing telephone, or a baby monitor.  Television closed-captioning. This shows the words at the bottom of the screen. Most new TVs can do this.  TTY (text telephone). This lets you type messages back and forth on the telephone instead of talking or listening. These devices are also called TDD. When messages are typed on the keyboard, they are sent over the phone line to a receiving TTY. The message is shown on a monitor.  Use text messaging, social media, and email if it is hard for you to communicate by telephone.  Try to learn a listening technique called speechreading. It is not lipreading. You pay attention to people's gestures, expressions, posture, and tone of voice. These clues can help you understand what a person is saying. Face the person you are talking to, and have them face you. Make sure the lighting is good. You need to see the other person's face clearly.  Think about counseling if you need help to adjust to your hearing loss.  When should you call for help?  Watch closely for changes in your health, and be sure to contact your doctor if:    You think your hearing is getting worse.     You have new symptoms, such as dizziness or nausea.   Where can you learn more?  Go to https://www.PandaBed.net/patiented  Enter R798 in the search box to learn more about \"Hearing Loss: Care Instructions.\"  Current as of: February 28, 2023               Content Version: 13.8    1657-0879 [employer].   Care instructions " adapted under license by your healthcare professional. If you have questions about a medical condition or this instruction, always ask your healthcare professional. [employer] disclaims any warranty or liability for your use of this information.      Bladder Training: Care Instructions  Your Care Instructions     Bladder training is used to treat urge incontinence and stress incontinence. Urge incontinence means that the need to urinate comes on so fast that you can't get to a toilet in time. Stress incontinence means that you leak urine because of pressure on your bladder. For example, it may happen when you laugh, cough, or lift something heavy.  Bladder training can increase how long you can wait before you have to urinate. It can also help your bladder hold more urine. And it can give you better control over the urge to urinate.  It is important to remember that bladder training takes a few weeks to a few months to make a difference. You may not see results right away, but don't give up.  Follow-up care is a key part of your treatment and safety. Be sure to make and go to all appointments, and call your doctor if you are having problems. It's also a good idea to know your test results and keep a list of the medicines you take.  How can you care for yourself at home?  Work with your doctor to come up with a bladder training program that is right for you. You may use one or more of the following methods.  Delayed urination  In the beginning, try to keep from urinating for 5 minutes after you first feel the need to go.  While you wait, take deep, slow breaths to relax. Kegel exercises can also help you delay the need to go to the bathroom.  After some practice, when you can easily wait 5 minutes to urinate, try to wait 10 minutes before you urinate.  Slowly increase the waiting period until you are able to control when you have to urinate.  Scheduled urination  Empty your bladder when you first wake  "up in the morning.  Schedule times throughout the day when you will urinate.  Start by going to the bathroom every hour, even if you don't need to go.  Slowly increase the time between trips to the bathroom.  When you have found a schedule that works well for you, keep doing it.  If you wake up during the night and have to urinate, do it. Apply your schedule to waking hours only.  Kegel exercises  These tighten and strengthen pelvic muscles, which can help you control the flow of urine. (If doing these exercises causes pain, stop doing them and talk with your doctor.) To do Kegel exercises:  Squeeze your muscles as if you were trying not to pass gas. Or squeeze your muscles as if you were stopping the flow of urine. Your belly, legs, and buttocks shouldn't move.  Hold the squeeze for 3 seconds, then relax for 5 to 10 seconds.  Start with 3 seconds, then add 1 second each week until you are able to squeeze for 10 seconds.  Repeat the exercise 10 times a session. Do 3 to 8 sessions a day.  When should you call for help?  Watch closely for changes in your health, and be sure to contact your doctor if:    Your incontinence is getting worse.     You do not get better as expected.   Where can you learn more?  Go to https://www.[employer]net/patiented  Enter V684 in the search box to learn more about \"Bladder Training: Care Instructions.\"  Current as of: February 28, 2023               Content Version: 13.8    5580-2837 [employer].   Care instructions adapted under license by your healthcare professional. If you have questions about a medical condition or this instruction, always ask your healthcare professional. [employer] disclaims any warranty or liability for your use of this information.      Your Health Risk Assessment indicates you feel you are not in good emotional health.    Recreation   Recreation is not limited to sports and team events. It includes any activity that provides " relaxation, interest, enjoyment, and exercise. Recreation provides an outlet for physical, mental, and social energy. It can give a sense of worth and achievement. It can help you stay healthy.    Mental Exercise and Social Involvement  Mental and emotional health is as important as physical health. Keep in touch with friends and family. Stay as active as possible. Continue to learn and challenge yourself.   Things you can do to stay mentally active are:  Learn something new, like a foreign language or musical instrument.   Play SCRABBLE or do crossword puzzles. If you cannot find people to play these games with you at home, you can play them with others on your computer through the Internet.   Join a games club--anything from card games to chess or checkers or lawn bowling.   Start a new hobby.   Go back to school.   Volunteer.   Read.   Keep up with world events.

## 2023-12-02 DIAGNOSIS — R97.20 ELEVATED PROSTATE SPECIFIC ANTIGEN (PSA): Primary | ICD-10-CM

## 2023-12-03 NOTE — RESULT ENCOUNTER NOTE
Prostate blood test still high.  Advise you follow up with urology again.    Cholesterol high but better than last year.    Hemoglobin a1c test ( for diabetes and prediabetes ) is back to normal.    Kaiden Emmanuel MD

## 2023-12-11 ENCOUNTER — PATIENT OUTREACH (OUTPATIENT)
Dept: GASTROENTEROLOGY | Facility: CLINIC | Age: 63
End: 2023-12-11
Payer: COMMERCIAL

## 2025-01-04 ENCOUNTER — HEALTH MAINTENANCE LETTER (OUTPATIENT)
Age: 65
End: 2025-01-04